# Patient Record
Sex: MALE | Race: WHITE | NOT HISPANIC OR LATINO | Employment: UNEMPLOYED | URBAN - METROPOLITAN AREA
[De-identification: names, ages, dates, MRNs, and addresses within clinical notes are randomized per-mention and may not be internally consistent; named-entity substitution may affect disease eponyms.]

---

## 2015-04-14 LAB — CRC RECOMMENDATION EXT: NORMAL

## 2017-03-13 ENCOUNTER — HISTORICAL (OUTPATIENT)
Dept: INTERNAL MEDICINE | Facility: CLINIC | Age: 62
End: 2017-03-13

## 2017-10-04 ENCOUNTER — HISTORICAL (OUTPATIENT)
Dept: RADIOLOGY | Facility: HOSPITAL | Age: 62
End: 2017-10-04

## 2017-10-27 ENCOUNTER — HISTORICAL (OUTPATIENT)
Dept: INTERNAL MEDICINE | Facility: CLINIC | Age: 62
End: 2017-10-27

## 2017-12-04 ENCOUNTER — HISTORICAL (OUTPATIENT)
Dept: INTERNAL MEDICINE | Facility: CLINIC | Age: 62
End: 2017-12-04

## 2018-03-06 ENCOUNTER — HISTORICAL (OUTPATIENT)
Dept: INTERNAL MEDICINE | Facility: CLINIC | Age: 63
End: 2018-03-06

## 2018-06-04 ENCOUNTER — HISTORICAL (OUTPATIENT)
Dept: INTERNAL MEDICINE | Facility: CLINIC | Age: 63
End: 2018-06-04

## 2018-08-15 ENCOUNTER — HISTORICAL (OUTPATIENT)
Dept: ADMINISTRATIVE | Facility: HOSPITAL | Age: 63
End: 2018-08-15

## 2018-10-17 ENCOUNTER — HISTORICAL (OUTPATIENT)
Dept: INTERNAL MEDICINE | Facility: CLINIC | Age: 63
End: 2018-10-17

## 2019-02-28 ENCOUNTER — HISTORICAL (OUTPATIENT)
Dept: ADMINISTRATIVE | Facility: HOSPITAL | Age: 64
End: 2019-02-28

## 2019-03-02 LAB — FINAL CULTURE: NORMAL

## 2019-05-28 ENCOUNTER — HISTORICAL (OUTPATIENT)
Dept: INTERNAL MEDICINE | Facility: CLINIC | Age: 64
End: 2019-05-28

## 2019-11-19 ENCOUNTER — HISTORICAL (OUTPATIENT)
Dept: ADMINISTRATIVE | Facility: HOSPITAL | Age: 64
End: 2019-11-19

## 2019-12-11 ENCOUNTER — HISTORICAL (OUTPATIENT)
Dept: INTERNAL MEDICINE | Facility: CLINIC | Age: 64
End: 2019-12-11

## 2020-01-20 ENCOUNTER — HISTORICAL (OUTPATIENT)
Dept: RADIOLOGY | Facility: HOSPITAL | Age: 65
End: 2020-01-20

## 2020-09-24 ENCOUNTER — HISTORICAL (OUTPATIENT)
Dept: LAB | Facility: HOSPITAL | Age: 65
End: 2020-09-24

## 2021-05-09 LAB
LEFT EYE DM RETINOPATHY: NEGATIVE
RIGHT EYE DM RETINOPATHY: NEGATIVE

## 2021-09-03 ENCOUNTER — HISTORICAL (OUTPATIENT)
Dept: INTERNAL MEDICINE | Facility: CLINIC | Age: 66
End: 2021-09-03

## 2021-09-03 LAB
ABS NEUT (OLG): 6.06 X10(3)/MCL (ref 2.1–9.2)
ALBUMIN SERPL-MCNC: 3.7 GM/DL (ref 3.4–4.8)
ALBUMIN/GLOB SERPL: 1.2 RATIO (ref 1.1–2)
ALP SERPL-CCNC: 96 UNIT/L (ref 40–150)
ALT SERPL-CCNC: 21 UNIT/L (ref 0–55)
APPEARANCE, UA: CLEAR
AST SERPL-CCNC: 14 UNIT/L (ref 5–34)
BACTERIA #/AREA URNS AUTO: ABNORMAL /HPF
BASOPHILS # BLD AUTO: 0 X10(3)/MCL (ref 0–0.2)
BASOPHILS NFR BLD AUTO: 1 %
BILIRUB SERPL-MCNC: 0.4 MG/DL
BILIRUB UR QL STRIP: NEGATIVE
BILIRUBIN DIRECT+TOT PNL SERPL-MCNC: 0.2 MG/DL (ref 0–0.5)
BILIRUBIN DIRECT+TOT PNL SERPL-MCNC: 0.2 MG/DL (ref 0–0.8)
BUN SERPL-MCNC: 6.5 MG/DL (ref 8.4–25.7)
CALCIUM SERPL-MCNC: 8.9 MG/DL (ref 8.8–10)
CHLORIDE SERPL-SCNC: 108 MMOL/L (ref 98–107)
CHOLEST SERPL-MCNC: 114 MG/DL
CHOLEST/HDLC SERPL: 3 {RATIO} (ref 0–5)
CO2 SERPL-SCNC: 25 MMOL/L (ref 23–31)
COLOR UR: NORMAL
CREAT SERPL-MCNC: 1.02 MG/DL (ref 0.73–1.18)
CREAT UR-MCNC: 149.6 MG/DL (ref 58–161)
DEPRECATED CALCIDIOL+CALCIFEROL SERPL-MC: 31.8 NG/ML (ref 30–80)
EOSINOPHIL # BLD AUTO: 0.2 X10(3)/MCL (ref 0–0.9)
EOSINOPHIL NFR BLD AUTO: 3 %
ERYTHROCYTE [DISTWIDTH] IN BLOOD BY AUTOMATED COUNT: 15.1 % (ref 11.5–14.5)
EST. AVERAGE GLUCOSE BLD GHB EST-MCNC: 139.8 MG/DL
GLOBULIN SER-MCNC: 3.2 GM/DL (ref 2.4–3.5)
GLUCOSE (UA): NEGATIVE
GLUCOSE SERPL-MCNC: 116 MG/DL (ref 82–115)
HBA1C MFR BLD: 6.5 %
HCT VFR BLD AUTO: 47.1 % (ref 40–51)
HDLC SERPL-MCNC: 37 MG/DL (ref 35–60)
HGB BLD-MCNC: 14.6 GM/DL (ref 13.5–17.5)
HGB UR QL STRIP: NEGATIVE
HYALINE CASTS #/AREA URNS LPF: ABNORMAL /LPF
IMM GRANULOCYTES # BLD AUTO: 0.03 10*3/UL
IMM GRANULOCYTES NFR BLD AUTO: 0 %
KETONES UR QL STRIP: NEGATIVE
LDLC SERPL CALC-MCNC: 51 MG/DL (ref 50–140)
LEUKOCYTE ESTERASE UR QL STRIP: NEGATIVE
LYMPHOCYTES # BLD AUTO: 1.6 X10(3)/MCL (ref 0.6–4.6)
LYMPHOCYTES NFR BLD AUTO: 18 %
MCH RBC QN AUTO: 28.9 PG (ref 26–34)
MCHC RBC AUTO-ENTMCNC: 31 GM/DL (ref 31–37)
MCV RBC AUTO: 93.3 FL (ref 80–100)
MICROALBUMIN UR-MCNC: 24.9 MG/L
MICROALBUMIN/CREAT RATIO PNL UR: 16.6 MG/GM CR (ref 0–30)
MONOCYTES # BLD AUTO: 0.7 X10(3)/MCL (ref 0.1–1.3)
MONOCYTES NFR BLD AUTO: 8 %
NEUTROPHILS # BLD AUTO: 6.06 X10(3)/MCL (ref 2.1–9.2)
NEUTROPHILS NFR BLD AUTO: 69 %
NITRITE UR QL STRIP: NEGATIVE
NRBC BLD AUTO-RTO: 0 % (ref 0–0.2)
PH UR STRIP: 5 [PH] (ref 4.5–8)
PLATELET # BLD AUTO: 263 X10(3)/MCL (ref 130–400)
PMV BLD AUTO: 10.5 FL (ref 7.4–10.4)
POTASSIUM SERPL-SCNC: 4.6 MMOL/L (ref 3.5–5.1)
PROT UR QL STRIP: NEGATIVE
RBC # BLD AUTO: 5.05 X10(6)/MCL (ref 4.5–5.9)
RBC #/AREA URNS AUTO: ABNORMAL /HPF
SODIUM SERPL-SCNC: 140 MMOL/L (ref 136–145)
SP GR UR STRIP: 1.01 (ref 1–1.03)
SQUAMOUS #/AREA URNS LPF: ABNORMAL /LPF
TRIGL SERPL-MCNC: 132 MG/DL (ref 34–140)
UROBILINOGEN UR STRIP-ACNC: NORMAL
VLDLC SERPL CALC-MCNC: 26 MG/DL
WBC # SPEC AUTO: 8.7 X10(3)/MCL (ref 4.5–11)
WBC #/AREA URNS AUTO: ABNORMAL /HPF

## 2021-09-13 ENCOUNTER — HISTORICAL (OUTPATIENT)
Dept: ADMINISTRATIVE | Facility: HOSPITAL | Age: 66
End: 2021-09-13

## 2021-12-16 ENCOUNTER — HISTORICAL (OUTPATIENT)
Dept: RADIOLOGY | Facility: HOSPITAL | Age: 66
End: 2021-12-16

## 2022-04-09 ENCOUNTER — HISTORICAL (OUTPATIENT)
Dept: ADMINISTRATIVE | Facility: HOSPITAL | Age: 67
End: 2022-04-09

## 2022-04-25 VITALS
BODY MASS INDEX: 31.74 KG/M2 | HEIGHT: 69 IN | WEIGHT: 214.31 LBS | DIASTOLIC BLOOD PRESSURE: 72 MMHG | SYSTOLIC BLOOD PRESSURE: 134 MMHG | OXYGEN SATURATION: 100 %

## 2022-05-02 NOTE — HISTORICAL [facility] CERNER
This is a historical note converted from [employer]. Formatting and pictures may have been removed.  Please reference [employer] for original formatting and attached multimedia. Chief Complaint  cough productive off and on ,chest congetion ,nasal congetion x3 weeks  History of Present Illness  64 yo male with productive cough, nasal congestion, post nasal drip?x 3 weeks. + Wheezing. No chest pain or swelling; no orthopnea. PFT was done by PCP earlier this year was normal. He is on ventolin MDI and nebs. No recent weight loss or night sweats reported.  Review of Systems  GENERAL: Negative except as stated?in HPI  CV: Negative except as stated in HPI  RESP: Negative except as stated?in HPI  GI: Negative?except as stated in?HPI  : Negative?except as stated in?HPI  SKIN: Negative?except as stated in?HPI  Neuro: Negative?except as stated in?HPI  MS: Negative?except as stated in?HPI  Psych: Negative?except as stated in?HPI  Physical Exam  Vitals & Measurements  T:?36.8? ?C (Oral)? HR:?83(Peripheral)? RR:?18? BP:?121/69? SpO2:?94%?  HT:?172?cm? HT:?172?cm? WT:?94.2?kg? WT:?94.2?kg? BMI:?31.84?  Vital Signs reviewed  GENERAL: Awake and alert; no acute distress.  HENT: Normocephalic.??Boggy turbinates with erythema and exudate noted. Normal appearing oral cavity; postnasal drip noted to?posterior pharynx.  CV: Normal rate and rhythm. No murmur or JVD. No edema.  RESP: Respirations even and nonlabored. ?Inspiratory and?expiratory wheezes throughout all lung fields; prolonged expiratory phase noted.  NEURO: Awake, alert and oriented. No focal or sensory deficits.  INTEGUMENTARY: Skin warm, dry, and intact. No rashes or?unusual bruising.  PSYCH: Appropriate mood and affect; cooperative.  Assessment/Plan  1.?Acute bronchospasm,?Acute bronchospasm  Chest x-ray PA and lateral with no acute cardiopulmonary processes. Duoneb ?1 with improvement of air exchange and coughing. ?Prescriptions for DuoNeb, prednisone. Tussionex cough syrup 5 mL  at bedtime as needed for cough. ?Follow up with PCP if condition persists. ?Present to ER for any?respiratory distress worsening in?condition.  Ordered:  After Hrs Visit [id number] PC, Cough  Acute bronchospasm  Sinusitis, 08/15/18 19:09:00 CDT  Office/Outpatient Visit Level 4 Established [id number] PC, Cough  Acute bronchospasm  Sinusitis, 08/15/18 19:09:00 CDT  ?  2.?Sinusitis  Singulair 10 mg daily; Flonase nasal spray as prescribed. ?Follow up with PCP if condition worsens.  Ordered:  After Hrs Visit [id number] PC, Cough  Acute bronchospasm  Sinusitis, 08/15/18 19:09:00 CDT  Office/Outpatient Visit Level 4 Established [id number] PC, Cough  Acute bronchospasm  Sinusitis, 08/15/18 19:09:00 CDT  ?  Orders:  albuterol-ipratropium, 3 mL, INH, QID, PRN PRN as needed for shortness of breath or wheezing, # 90 mL, 3 Refill(s), Pharmacy: [facility]  chlorpheniramine-hydrocodone, See Instructions, PRN PRN for cough, 5 ml orally at bedtime as needed for cough, # 50 mL, 0 Refill(s)  fluticasone nasal, 1 spray(s), Nasal, BID, # 1 bottle(s), 1 Refill(s), Pharmacy: [facility]  montelukast, 10 mg = 1 tab(s), Oral, qPM, # 30 tab(s), 2 Refill(s), Pharmacy: [facility]  predniSONE, 40 mg = 2 tab(s), Oral, Daily, X 5 day(s), # 10 tab(s), 0 Refill(s), Pharmacy: [facility]   Problem List/Past Medical History  Ongoing  BPH with urinary obstruction  Cholesterol  Chronic bronchitis  Diabetes  Family history of prostate carcinoma  HTN (hypertension)  Left hydrocele  MI (myocardial infarction)  Historical  Diabetes  Hypertension  Myocardial infarct  Procedure/Surgical History  Cataract Extraction Phacoemulsification (Left) (03/17/2016)  Extracapsular cataract removal with insertion of intraocular lens prosthesis (1 stage procedure), manual or mechanical technique (eg, irrigation and aspiration or phacoemulsification) (03/17/2016)  Replacement of Left Lens with Synthetic Substitute, Percutaneous  Approach (03/17/2016)  Colonoscopy (04/14/2015)  Colonoscopy (04/14/2015)  Colonoscopy, flexible; diagnostic, including collection of specimen(s) by brushing or washing, when performed (separate procedure) (04/14/2015)  Percutaneous transluminal coronary atherectomy, with intracoronary stent, with coronary angioplasty when performed; each additional branch of a major coronary artery (List separately in addition to code for primary procedure)  Stent placement   Medications  albuterol 2.5 mg/3 mL (0.083%) inhalation solution, 2.5 mg= 3 mL, INH, q6hr, PRN, 3 refills  aspirin 81 mg oral Delayed Release (EC) tablet, 81 mg= 1 tab(s), Oral, Daily, 4 refills  DuoNeb 0.5 mg-2.5 mg/3 mL inhalation solution, 3 mL, INH, QID, PRN, 3 refills  Fish Oil oral capsule, 1 cap(s), Oral, Daily  Flonase 50 mcg/inh nasal spray, 1 spray(s), Nasal, BID, 1 refills  glipiZIDE 10 mg oral tablet, 10 mg= 1 tab(s), Oral, BID, 3 refills  Lidocaine Viscous 2% mucous membrane solution, 15 mL/EA, N/A, Once  lisinopril 5 mg oral tablet, 5 mg= 1 tab(s), Oral, Daily, 4 refills  metFORMIN 1000 mg oral tablet, 1000 mg= 1 tab(s), Oral, BID, 6 refills  metoprolol tartrate 50 mg oral tab, 50 mg= 1 tab(s), Oral, BID, 4 refills  Nebulizer Machine, See Instructions  Nitrostat 0.4 mg sublingual tab, 0.4 mg= 1 tab(s), SL, q5min, PRN  prednisONE 20 mg oral tablet, 40 mg= 2 tab(s), Oral, Daily  simvastatin 40 mg oral tablet, See Instructions, 2 refills  Singulair 10 mg oral TABLET, 10 mg= 1 tab(s), Oral, qPM, 2 refills  Tussionex [employer] 10 mg-8 mg/5 mL oral suspension, extended release, See Instructions, PRN  Ventolin HFA 90 mcg/inh inhalation aerosol, 2 puff(s), INH, q4hr, PRN, 3 refills  Allergies  No Known Allergies  Social History  Alcohol - Denies Alcohol Use, 08/14/2014  Past, Beer, Wine, Started age 17 Years. Stopped age 48 Years., 08/15/2018  Employment/School  Employed, Work/School description: fisherman. Highest education level: High school. Operates  hazardous equipment: No., 06/11/2015  Exercise  Exercise frequency: 5-6 times/week. Self assessment: Good condition. Exercise type: Walking., 03/19/2015  Home/Environment  Lives with Spouse. Living situation: Home/Independent. Alcohol abuse in household: No. Substance abuse in household: No. Smoker in household: No. Injuries/Abuse/Neglect in household: No. Feels unsafe at home: No. Safe place to go: Yes. Family/Friends available for support: Yes. Concern for family members at home: No. Major illness in household: No. Financial concerns: No. TV/Computer concerns: No., 06/11/2015  Nutrition/Health  Type of diet: regular. Regular, Caffeine intake amount: coffee 3 cups in morning. Wants to lose weight: Yes. Sleeping concerns: No. Feels highly stressed: No., 06/11/2015  Sexual  Sexually active: Yes. Sexually active at age 15 Years. Number of current partners 1. Number of lifetime partners 7. Sexual orientation: Heterosexual. History of sexual abuse: No., 03/03/2015  Substance Abuse - Denies Substance Abuse, 08/14/2014  Never, 08/15/2018  Tobacco - Denies Tobacco Use, 08/14/2014  Former smoker, Cigarettes, Started age 15.0 Years. Stopped age 48 Years. Previous treatment: None., 01/11/2018  Family History  Diabetes mellitus type 2: Mother and Brother.  Heart attack: Father and Brother.  Hypertension.: Father and Brother.  Primary malignant neoplasm of prostate: Brother.  Schizophrenia: Mother.  Stroke: Mother.  Immunizations  Vaccine Date Status Comments   tetanus/diphtheria/pertussis, acel(Tdap) 06/14/2018 Given    influenza virus vaccine, inactivated 10/03/2017 Given    influenza virus vaccine, inactivated 12/15/2016 Given    pneumococcal 13-valent conjugate vaccine 10/28/2015 Given tolerated well   influenza virus vaccine, inactivated 10/28/2015 Given tolerated well   influenza virus vaccine, inactivated 10/22/2014 Recorded    Health Maintenance  Health Maintenance  ???Pending?(in the next year)  ??? ??OverDue  ??? ? ?  ?Coronary Artery Disease Maintenance-Antiplatelet Agent Prescribed due??and every?  ??? ? ? ?Coronary Artery Disease Maintenance-Lipid Lowering Therapy due??and every?  ??? ? ? ?Influenza Vaccine due??and every?  ??? ??Due?  ??? ? ? ?Diabetes Maintenance-Eye Exam due??08/15/18??and every 1??year(s)  ??? ? ? ?Diabetes Maintenance-Foot Exam due??08/15/18??and every?  ??? ? ? ?Zoster Vaccine due??08/15/18??and every 100??year(s)  ??? ??Due In Future?  ??? ? ? ?Hypertension Management-BMP not due until??11/22/18??and every 1??year(s)  ??? ? ? ?Aspirin Therapy for CVD Prevention not due until??04/03/19??and every 1??year(s)  ??? ? ? ?Alcohol Misuse Screening not due until??04/03/19??and every 1??year(s)  ??? ? ? ?Diabetes Maintenance-Fasting Lipid Profile not due until??06/04/19??and every 1??year(s)  ??? ? ? ?Diabetes Maintenance-HgbA1c not due until??06/04/19??and every 1??year(s)  ??? ? ? ?Blood Pressure Screening not due until??07/16/19??and every 1??year(s)  ??? ? ? ?Body Mass Index Check not due until??07/16/19??and every 1??year(s)  ??? ? ? ?Depression Screening not due until??07/16/19??and every 1??year(s)  ??? ? ? ?Hypertension Management-Blood Pressure not due until??07/16/19??and every 1??year(s)  ???Satisfied?(in the past 1 year)  ??? ??Satisfied?  ??? ? ? ?Alcohol Misuse Screening on??04/03/18.??Satisfied by Lacy Wesley MD  ??? ? ? ?Aspirin Therapy for CVD Prevention on??04/03/18.??Satisfied by Lacy Wesley MD??Reason: Expectation Satisfied Elsewhere  ??? ? ? ?Blood Pressure Screening on??08/15/18.??Satisfied by Ambar Whitaker LPN  ??? ? ? ?Body Mass Index Check on??08/15/18.??Satisfied by Ambar Whitaker LPN  ??? ? ? ?Coronary Artery Disease Maintenance-Lipid Lowering Therapy on??06/14/18.??Satisfied by Lacy Wesley MD  ??? ? ? ?Depression Screening on??08/15/18.??Satisfied by Ambar Whitaker LPN  ??? ? ? ?Diabetes Maintenance-Eye Exam on??05/09/18.??Satisfied by Rene Nguyen MDghar  ??? ? ? ?Diabetes  Maintenance-Fasting Lipid Profile on??06/04/18.??Satisfied by Jacy Georges  ??? ? ? ?Diabetes Maintenance-HgbA1c on??09/14/18.??Satisfied by Lacy Wesley MD  ??? ? ? ?Diabetes Screening on??06/04/18.??Satisfied by Jacy Goerges  ??? ? ? ?Hypertension Management-Blood Pressure on??08/15/18.??Satisfied by Ambar Whitaker LPN  ??? ? ? ?Influenza Vaccine on??10/03/17.??Satisfied by Donna Teixeira  ??? ? ? ?Lipid Screening on??06/04/18.??Satisfied by Jacy Georges  ??? ? ? ?Obesity Screening on??08/15/18.??Satisfied by Ambar Whitaker LPN  ??? ? ? ?Smoking Cessation (Coronary Artery Disease) on??11/22/17.??Satisfied by Lyndsay Anthony RN  ??? ? ? ?Smoking Cessation (Diabetes) on??11/22/17.??Satisfied by Lyndsay Anthony RN  ??? ? ? ?Tetanus Vaccine on??06/14/18.??Satisfied by Donna Teixeira  ?  ?  (08/15/2018 18:21 CDT XR Chest 2 Views)  Reason For Exam  cough;Cough  ?  Radiology Report  Clinical History  Cough  ?  Technique  2 views of the chest.  ?  Comparison  November 22nd 2017  ?  Findings  Lungs are clear with no visualized focal airspace opacity.  The trachea appears midline.  The cardiomediastinal silhouette is within normal limits.  There is no evidence of pneumothorax or pleural effusion.  Visualized abdomen, soft tissues, and osseous structures are  unremarkable.  ?  Impression  No acute cardiopulmonary process.  ?  ?  Signature Line  Electronically Signed By: Morgan Rodriguez MD  Date/Time Signed: 08/15/2018 18:22  ? [1]     [1]?XR Chest 2 Views; Morgan Rodriguez MD 08/15/2018 18:21 CDT

## 2022-05-02 NOTE — HISTORICAL [facility] CERNER
This is a historical note converted from [employer]. Formatting and pictures may have been removed.  Please reference [employer] for original formatting and attached multimedia. Chief Complaint  Needs refills on all meds. Left heel pain x6 months off and on, no injury  History of Present Illness  66 year old  male is ?138/78 today.? Patients blood pressure is controlled on home meds. ?Patient has requested?for medication refills?today.??Patient is complaining of?? L heel pain, episodic and worse with long time standing.? He takes Tylenol for it?which helps for the pain.? An x-ray of the foot?is ordered for the patient today.?Patient is advised to use heel pad for?the pain as well. ?Patient denied any chest pain, shortness of breath, fever, chills, nausea, vomiting, weakness or any abdominal pain at this time.? His A1c level?is 6.5 today,?managed?with his home meds.? His lipid panels are unremarkable?at this time.? Patient got his covid vaccine ( Arch[employer]izer). Patient is to return back to the clinic in 6 months with labs.  Review of Systems  Constitutional:?no fever, fatigue, weakness  Eye:?no vision loss, eye redness, drainage, or pain  ENMT:?no sore throat, ear pain, sinus pain/congestion, nasal congestion/drainage  Respiratory:?no cough, no wheezing, no shortness of breath  Cardiovascular:?no chest pain, no palpitations, no edema  Gastrointestinal:?no nausea, vomiting, or diarrhea. No abdominal pain  Musculoskeletal:?Left plantar?foot pain , no joint swelling  Integumentary:?no skin rash or abnormal lesion  Neurologic: no headache, no dizziness, no weakness or numbness  ?  Physical Exam  Vitals & Measurements  T:?36.6? ?C (Oral)? HR:?64(Peripheral)? RR:?18? BP:?138/78? SpO2:?100%?  HT:?174.00?cm? WT:?94.300?kg? BMI:?31.15?  General:?Alert and oriented, No acute distress.  Respiratory:?Lungs are clear to auscultation, Respirations are non-labored, Breath sounds are equal, Symmetrical chest wall expansion, No chest wall  tenderness.  Cardiovascular:?Normal rate, Regular rhythm, No murmur, No gallop, Good pulses equal in all extremities, Normal peripheral perfusion, No edema.  Musculoskeletal:?Left plantar foot tenderness on palpation ,Normal range of motion, Normal strength, No tenderness, No swelling, No deformity, Normal gait.  Integumentary: Warm, Pink, Intact, Moist, No pallor, No rash.  Cognition and Speech: Oriented, Speech clear and coherent, Functional cognition intact.?  ?  ?  Assessment/Plan  Left?heel pain  Likely Plantar fascitis  -Left heel pain  -Takes tylenol whihc helps  - Xray of the left foot ordered  ?   Diabetes mellitus type II - Controlled  -POC ~6.5  -pt did not tolerate empagliflozin (reported GI upset)  -continue Januvia (sitagliptin) 100mg PO daily  -continue metformin 1000mg BID and glipizide 10mg BID  ?   Hypertension  -normotensive  -continue metoprolol tartrate 50mg BID & losartan to 25mg BID (convenient dosing as metoprolol already given BID)  -refuses BP cuff due to financial reasons, will continue to check at pharmacy when there  ?   L Hydrocele s/p surgical correction  -resolved  -followed by Urology  ?   CAD s/p?coronary stent  -well controlled, MI was 12 years ago, denies any current?chest pain  -continue ASA, statin, beta blocker; held ACE-i at this time?due to cough  ?   Dyslipidemia  -on atorvastatin 40mg qd  -ASCVD score of 23.1% currently, 7.5% if optimized medically  -Lipid panels are unremarkable  ?   Diverticulosis  -last episode of diverticulitis was in March 2016  -encouraged high fiber diet,?and weight loss  -last colonoscopy was 2015?  ?  L eye cataract- resolved  - following up with ophthalmology [facility]. Had ophthalmology clinic visit on April 2017. Diabetes mellitus without retinopathy.  ?   Health Maintenance  Vaccinations  -Flu: 10/1/2020  -Pneumonia: PCV 13 2015, Pneumococcal 23 2021  -Shingles: given?two doses 2019  -Tdap: given 2018  Screening  -Lung Ca. Screening: discuss  risks/benefits, pt chose to defer at this time and discuss at later visit  -Colon Ca. Screening: Colonscopy due 2025  -AAA: discuss at next visit  -Hep C, HIV: negative 2016  -Prostate: discussed PSA. Pt chose no surveillance at this time but will consider for future visits  ?  ?   Problem List/Past Medical History  Ongoing  Cholesterol  Chronic bronchitis  Diabetes  Family history of prostate carcinoma  Flu vaccine need  HTN (hypertension)  Hyperlipidemia  MI (myocardial infarction)  Historical  BPH with urinary obstruction  Diabetes  Hypertension  Left hydrocele  Myocardial infarct  Procedure/Surgical History  Cataract Extraction Phacoemulsification (Left) (03/17/2016)  Extracapsular cataract removal with insertion of intraocular lens prosthesis (1 stage procedure), manual or mechanical technique (eg, irrigation and aspiration or phacoemulsification) (03/17/2016)  Replacement of Left Lens with Synthetic Substitute, Percutaneous Approach (03/17/2016)  Colonoscopy (04/14/2015)  Colonoscopy (04/14/2015)  Colonoscopy, flexible; diagnostic, including collection of specimen(s) by brushing or washing, when performed (separate procedure) (04/14/2015)  Percutaneous transluminal coronary atherectomy, with intracoronary stent, with coronary angioplasty when performed; each additional branch of a major coronary artery (List separately in addition to code for primary procedure)   Medications  aspirin 81 mg oral Delayed Release (EC) tablet, 81 mg= 1 tab(s), Oral, Daily, 4 refills  atorvastatin 40 mg oral tablet, See Instructions, 1 refills  Fish Oil oral capsule, 1 cap(s), Oral, Daily  fluticasone 50 mcg/inh nasal spray, See Instructions  glipiZIDE 10 mg oral tablet, 10 mg= 1 tab(s), Oral, BID  Januvia 100 mg oral tablet, 100 mg= 1 tab(s), Oral, Daily, 6 refills  Lidocaine Viscous 2% mucous membrane solution, 15 mL/EA, N/A, Once  losartan 25 mg oral tablet, 25 mg= 1 tab(s), Oral, BID, 5 refills  metFORMIN 1000 mg oral tablet,  1000 mg= 1 tab(s), Oral, BID, 6 refills  metoprolol tartrate 50 mg oral tab, 50 mg= 1 tab(s), Oral, BID, 6 refills  pneumococcal 23-polyvalent vaccine, 0.5 mL, IM, Once  Allergies  No Known Allergies  Social History  Abuse/Neglect  No, No, Yes, 09/13/2021  Alcohol - Denies Alcohol Use, 08/14/2014  Current, Beer, 1-2 times per week, 02/18/2021  Employment/School  Retired, 02/18/2021  Exercise  Exercise duration: 30. Exercise frequency: Daily. Exercise type: Walking., 02/18/2021  Home/Environment  Lives with Alone. Living situation: Home/Independent. Single family house, 02/18/2021  Nutrition/Health  Regular, Low carbohydrate, Good, 05/06/2021  Spiritual/Cultural  Sikhism, 02/18/2021  Substance Use - Denies Substance Abuse, 08/14/2014  Never, 02/18/2021  Tobacco - Denies Tobacco Use, 08/14/2014  Former smoker, quit more than 30 days ago, N/A, 09/13/2021  Family History  Diabetes mellitus type 2: Mother and Brother.  Heart attack: Father and Brother.  Hypertension.: Father and Brother.  Primary malignant neoplasm of prostate: Brother.  Schizophrenia: Mother.  Stroke: Mother.  Immunizations  Vaccine Date Status Comments   COVID-19 MRNA, LNP-S, PF- Pfizer 08/09/2021 Recorded    COVID-19 MRNA, LNP-S, PF- Pfizer 07/19/2021 Recorded    pneumococcal 23-polyvalent vaccine 05/06/2021 Given    influenza virus vaccine, inactivated 10/01/2020 Given    influenza virus vaccine, inactivated 12/26/2019 Given    zoster vaccine, inactivated 05/17/2019 Given    zoster vaccine, inactivated 10/29/2018 Given    influenza virus vaccine, inactivated 10/29/2018 Given    tetanus/diphtheria/pertussis, acel(Tdap) 06/14/2018 Given    influenza virus vaccine, inactivated 10/03/2017 Given    influenza virus vaccine, inactivated 12/15/2016 Given    pneumococcal 13-valent conjugate vaccine 10/28/2015 Given tolerated well   influenza virus vaccine, inactivated 10/28/2015 Given tolerated well   influenza virus vaccine, inactivated 10/22/2014 Recorded     Health Maintenance  Health Maintenance  ???Pending?(in the next year)  ??? ??Due?  ??? ? ? ?COPD Maintenance-Spirometry due??09/13/21??Unknown Frequency  ??? ? ? ?IPPE due??09/13/21??One-time only  ??? ? ? ?Medicare Annual Wellness Exam due??09/13/21??and every 1??year(s)  ??? ??Due In Future?  ??? ? ? ?Obesity Screening not due until??01/01/22??and every 1??year(s)  ??? ? ? ?Advance Directive not due until??01/02/22??and every 1??year(s)  ??? ? ? ?Alcohol Misuse Screening not due until??01/02/22??and every 1??year(s)  ??? ? ? ?Cognitive Screening not due until??01/02/22??and every 1??year(s)  ??? ? ? ?Fall Risk Assessment not due until??01/02/22??and every 1??year(s)  ??? ? ? ?Functional Assessment not due until??01/02/22??and every 1??year(s)  ??? ? ? ?Diabetes Maintenance-Foot Exam not due until??05/06/22??and every 1??year(s)  ??? ? ? ?Diabetes Maintenance-Eye Exam not due until??05/09/22??and every 1??year(s)  ??? ? ? ?Diabetes Maintenance-HgbA1c not due until??09/03/22??and every 1??year(s)  ??? ? ? ?Hypertension Management-BMP not due until??09/03/22??and every 1??year(s)  ???Satisfied?(in the past 1 year)  ??? ??Satisfied?  ??? ? ? ?ADL Screening on??09/13/21.??Satisfied by Gayle Pedroza LPN  ??? ? ? ?Advance Directive on??05/06/21.??Satisfied by Jorden SCHAEFER, Aarti  ??? ? ? ?Alcohol Misuse Screening on??05/06/21.??Satisfied by Kelsy Negrete MD  ??? ? ? ?Aspirin Therapy for CVD Prevention on??09/13/21.??Satisfied by Daphney Marino DO  ??? ? ? ?Blood Pressure Screening on??09/13/21.??Satisfied by Galye Pedroza LPN  ??? ? ? ?Body Mass Index Check on??09/13/21.??Satisfied by Gayle Pedroza LPN  ??? ? ? ?Cognitive Screening on??05/06/21.??Satisfied by Aarti Leonardo LPN  ??? ? ? ?Coronary Artery Disease Maintenance-Lipid Lowering Therapy on??09/13/21.??Satisfied by Daphney Marino DO  ??? ? ? ?Depression Screening on??09/13/21.??Satisfied by Gayle Pedroza LPN  ??? ? ?  ?Diabetes Maintenance-Fasting Lipid Profile on??09/03/21.??Satisfied by Anders Duarte.  ??? ? ? ?Diabetes Screening on??09/03/21.??Satisfied by Anders Duarte.  ??? ? ? ?Fall Risk Assessment on??09/13/21.??Satisfied by Gayle Pedroza LPN  ??? ? ? ?Functional Assessment on??05/06/21.??Satisfied by Aarti Leonardo LPN  ??? ? ? ?Hypertension Management-Blood Pressure on??09/13/21.??Satisfied by Gayle Pedroza LPN  ??? ? ? ?Influenza Vaccine on??10/01/20.??Satisfied by Jorden SCHAEFER, Aarti  ??? ? ? ?Lipid Screening on??09/03/21.??Satisfied by Anders Duarte.  ??? ? ? ?Obesity Screening on??09/13/21.??Satisfied by Gayle Pedroza LPN  ??? ? ? ?Pneumococcal Vaccine on??05/06/21.??Satisfied by Jorden SCHAEFER, Aarti  ??? ??Refused?  ??? ? ? ?COPD Maintenance-Spirometry on??05/06/21.??Recorded by Kelsy Negrete MD  ?      Reviewed present illness, physical exam, assessment/plan of resident. Case discussed with the resident. Care provided is reasonable and necessary.

## 2022-05-02 NOTE — HISTORICAL [facility] CERNER
This is a historical note converted from [employer]. Formatting and pictures may have been removed.  Please reference [employer] for original formatting and attached multimedia. Chief Complaint  Coughing x 1 month ?Requesting steroid injection if possible  History of Present Illness  64-year-old male with 4 to 6 weeks of dry cough, occasional sputum production which is clear.? Began with what sounds like URI, took a Medrol Dosepak and a course of doxycycline in mid October with no significant relief.? He states he is having some wheezing, minimal shortness of breath, no orthopnea or PND, no lower extremity edema.? He is using albuterol at home with brief episodes of relief.? He has had no fever or chills, he does have occasional yellow rhinorrhea and sinus pressure.? No sore throat.  Chart reviewed-normal chest x-ray August 2018.? PFTs were within normal limits in 2016 according to a provider note.  Review of Systems  Constitutional: negative except as stated in HPI  Eye: negative except as stated in HPI  ENT: negative except as stated in HPI  Respiratory: negative except as stated in HPI  Cardiovascular: negative except as stated in HPI  Gastrointestinal: negative except as stated in HPI  Genitourinary: negative except as stated in HPI  Hema/Lymph: negative except as stated in HPI  Endocrine: negative except as stated in HPI  Immunologic: negative except as stated in HPI  Musculoskeletal: negative except as stated in HPI  Integumentary: negative except as stated in HPI  Neurologic: negative except as stated in HPI  ?  Physical Exam  Vitals & Measurements  T:?36.8? ?C (Oral)? HR:?63(Peripheral)? RR:?20? BP:?159/63? SpO2:?99%?  HT:?174?cm? WT:?99?kg? BMI:?32.7?  VITAL SIGNS: ?Reviewed. ? ?  GENERAL: In no apparent distress  HEAD:? No signs of head trauma  EYES: Pupils are equal.? Extraocular motions intact  EARS: Small amount of cerumen bilaterally, TMs clear  NOSE: No rhinorrhea.? Mild bilateral maxillary sinus tenderness,  mild frontal sinus tenderness  MOUTH:?Cobblestoning, no erythema or exudate  NECK: No adenopathy, no JVD??  CHEST:?Diffuse?faint end expiratory wheezes bilaterally, nonfocal,?no crackles or rhonchi.  CARDIAC: Regular rate and rhythm  ABDOMEN:?Soft, nontender  MUSCULOSKELETAL: Good range of motion of all major joints. Extremities without clubbing, cyanosis or edema  NEUROLOGIC EXAM: Alert and oriented x 3.? No focal sensory or strength deficits.?? Speech normal.? Follows commands  PSYCHIATRIC: Mood normal  SKIN: No visualized rash  ?  Lab/Xray:  Chest x-ray-within normal limits  CBG-75  ?  ?  Assessment/Plan  Cough?R05  Ordered:  betamethasone, 9 mg, form: Injection, IM, Once-Unscheduled, first dose 11/19/19 11:00:00 CST  ?  Orders:  benzonatate, 200 mg = 1 cap(s), Oral, TID, PRN PRN cough, # 30 cap(s), 1 Refill(s), Pharmacy: [facility]  levocetirizine, 5 mg = 1 tab(s), Oral, qPM, # 14 tab(s), 2 Refill(s), Pharmacy: [id number]  Discussed potential etiologies of cough.? Will give IM Celestone,?Tessalon Perles,?switch to Xyzal.? I encouraged him to follow-up with his PCP to continue work-up as needed.? ER precautions discussed.   Problem List/Past Medical History  Ongoing  BPH with urinary obstruction  Cholesterol  Chronic bronchitis  Diabetes  Family history of prostate carcinoma  HTN (hypertension)  Hyperlipidemia  Left hydrocele  MI (myocardial infarction)  Historical  Diabetes  Hypertension  Myocardial infarct  Procedure/Surgical History  Cataract Extraction Phacoemulsification (Left) (03/17/2016)  Extracapsular cataract removal with insertion of intraocular lens prosthesis (1 stage procedure), manual or mechanical technique (eg, irrigation and aspiration or phacoemulsification) (03/17/2016)  Replacement of Left Lens with Synthetic Substitute, Percutaneous Approach (03/17/2016)  Colonoscopy (04/14/2015)  Colonoscopy (04/14/2015)  Colonoscopy, flexible; diagnostic, including collection of  specimen(s) by brushing or washing, when performed (separate procedure) (04/14/2015)  Percutaneous transluminal coronary atherectomy, with intracoronary stent, with coronary angioplasty when performed; each additional branch of a major coronary artery (List separately in addition to code for primary procedure)   Medications  albuterol 2.5 mg/3 mL (0.083%) inhalation solution, 2.5 mg= 3 mL, INH, q6hr, PRN, 3 refills,? ?Not taking: pt stated  aspirin 81 mg oral Delayed Release (EC) tablet, 81 mg= 1 tab(s), Oral, Daily, 4 refills  Astelin 137 mcg/inh nasal spray, 1 spray(s), Nasal, BID  Celestone Soluspan, 9 mg= 1.5 mL, IM, Once-Unscheduled  dextromethorphan-promethazine 15 mg-6.25 mg/5 mL oral syrup, 5 mL, Oral, q6hr  doxycycline hyclate 100 mg oral tablet, 100 mg= 1 tab(s), Oral, BID,? ?Not Taking, Completed Rx  DuoNeb 0.5 mg-2.5 mg/3 mL inhalation solution, 3 mL, INH, QID, PRN, 3 refills  Fish Oil oral capsule, 1 cap(s), Oral, Daily  Flonase 50 mcg/inh nasal spray, 1 spray(s), Nasal, BID, 1 refills  glipiZIDE 10 mg oral tablet, 10 mg= 1 tab(s), Oral, BID, 6 refills  Lidocaine Viscous 2% mucous membrane solution, 15 mL/EA, N/A, Once  losartan 25 mg oral tablet, 25 mg= 1 tab(s), Oral, Daily, 3 refills  metFORMIN 1000 mg oral tablet, 1000 mg= 1 tab(s), Oral, BID, 6 refills  methylPREDNISolone 4 mg oral tab,? ?Not Taking, Completed Rx  metoprolol tartrate 50 mg oral tab, 50 mg= 1 tab(s), Oral, BID, 6 refills  montelukast 10 mg oral TABLET, 10 mg= 1 tab(s), Oral, qPM, 11 refills  montelukast 10 mg oral TABLET, 10 mg= 1 tab(s), Oral, Daily, 3 refills  Nebulizer Machine, See Instructions  Nitrostat 0.4 mg sublingual tab, 0.4 mg= 1 tab(s), SL, q5min, PRN, 3 refills  simvastatin 40 mg oral tablet, See Instructions, 6 refills  Tessalon 200 mg oral capsule, 200 mg= 1 cap(s), Oral, TID, PRN, 1 refills  Ventolin HFA 90 mcg/inh inhalation aerosol, 2 puff(s), INH, q4hr, PRN, 3 refills  Xyzal 5 mg oral tablet, 5 mg= 1 tab(s), Oral,  qPM, 2 refills  Allergies  No Known Allergies  Social History  Abuse/Neglect  No, 11/19/2019  No, 10/19/2019  Alcohol - Denies Alcohol Use, 08/14/2014  Past, Beer, Wine, Started age 17 Years. Stopped age 48 Years., 08/15/2018  Employment/School  Employed, Work/School description: fisherman. Highest education level: High school. Operates hazardous equipment: No., 06/11/2015  Exercise  Exercise frequency: 5-6 times/week. Self assessment: Good condition. Exercise type: Walking., 03/19/2015  Home/Environment  Lives with Spouse. Living situation: Home/Independent. Alcohol abuse in household: No. Substance abuse in household: No. Smoker in household: No. Feels unsafe at home: No. Safe place to go: Yes. Family/Friends available for support: Yes., 10/29/2018  Lives with Spouse. Living situation: Home/Independent. Alcohol abuse in household: No. Substance abuse in household: No. Smoker in household: No. Injuries/Abuse/Neglect in household: No. Feels unsafe at home: No. Safe place to go: Yes. Family/Friends available for support: Yes. Concern for family members at home: No. Major illness in household: No. Financial concerns: No. TV/Computer concerns: No., 06/11/2015  Nutrition/Health  Regular, Wants to lose weight: Yes., 10/29/2018  Type of diet: regular. Regular, Caffeine intake amount: coffee 3 cups in morning. Wants to lose weight: Yes. Sleeping concerns: No. Feels highly stressed: No., 06/11/2015  Sexual  Sexually active: Yes. Sexually active at age 15 Years. Number of current partners 1. Number of lifetime partners 7. Sexual orientation: Heterosexual. History of sexual abuse: No., 03/03/2015  Substance Use - Denies Substance Abuse, 08/14/2014  Never, 08/15/2018  Tobacco - Denies Tobacco Use, 08/14/2014  Former smoker, quit more than 30 days ago, No, 11/19/2019  Family History  Diabetes mellitus type 2: Mother and Brother.  Heart attack: Father and Brother.  Hypertension.: Father and Brother.  Primary malignant neoplasm  of prostate: Brother.  Schizophrenia: Mother.  Stroke: Mother.  Immunizations  Vaccine Date Status Comments   zoster vaccine, inactivated 05/17/2019 Given    zoster vaccine, inactivated 10/29/2018 Given    influenza virus vaccine, inactivated 10/29/2018 Given    tetanus/diphtheria/pertussis, acel(Tdap) 06/14/2018 Given    influenza virus vaccine, inactivated 10/03/2017 Given    influenza virus vaccine, inactivated 12/15/2016 Given    pneumococcal 13-valent conjugate vaccine 10/28/2015 Given tolerated well   influenza virus vaccine, inactivated 10/28/2015 Given tolerated well   influenza virus vaccine, inactivated 10/22/2014 Recorded    Health Maintenance  Health Maintenance  ???Pending?(in the next year)  ??? ??OverDue  ??? ? ? ?Coronary Artery Disease Maintenance-Antiplatelet Agent Prescribed due??and every?  ??? ? ? ?Coronary Artery Disease Maintenance-Lipid Lowering Therapy due??and every?  ??? ? ? ?Diabetes Maintenance-Fasting Lipid Profile due??06/04/19??and every 1??year(s)  ??? ? ? ?Diabetes Maintenance-Foot Exam due??10/29/19??and every 1??year(s)  ??? ? ? ?ADL Screening due??10/29/19??and every 1??year(s)  ??? ??Due?  ??? ? ? ?Influenza Vaccine due??11/19/19??and every?  ??? ??Due In Future?  ??? ? ? ?Alcohol Misuse Screening not due until??01/01/20??and every 1??year(s)  ??? ? ? ?Obesity Screening not due until??01/01/20??and every 1??year(s)  ??? ? ? ?Diabetes Maintenance-Eye Exam not due until??05/08/20??and every 1??year(s)  ??? ? ? ?Diabetes Maintenance-HgbA1c not due until??05/27/20??and every 1??year(s)  ??? ? ? ?Hypertension Management-BMP not due until??05/27/20??and every 1??year(s)  ??? ? ? ?Depression Screening not due until??06/10/20??and every 1??year(s)  ??? ? ? ?Aspirin Therapy for CVD Prevention not due until??06/11/20??and every 1??year(s)  ??? ? ? ?Blood Pressure Screening not due until??11/18/20??and every 1??year(s)  ??? ? ? ?Body Mass Index Check not due until??11/18/20??and every  1??year(s)  ??? ? ? ?Hypertension Management-Blood Pressure not due until??11/18/20??and every 1??year(s)  ???Satisfied?(in the past 1 year)  ??? ??Satisfied?  ??? ? ? ?Alcohol Misuse Screening on??06/11/19.??Satisfied by Kelsy Negrete MD  ??? ? ? ?Aspirin Therapy for CVD Prevention on??06/11/19.??Satisfied by Kelsy Negrete MD??Reason: Expectation Satisfied Elsewhere  ??? ? ? ?Blood Pressure Screening on??11/19/19.??Satisfied by Fadumo Aviles LPN  ??? ? ? ?Body Mass Index Check on??11/19/19.??Satisfied by Fadumo Aviles LPN  ??? ? ? ?Coronary Artery Disease Maintenance-Lipid Lowering Therapy on??06/11/19.??Satisfied by Kelsy Negrete MD  ??? ? ? ?Depression Screening on??06/11/19.??Satisfied by Gomez Bryan LPN  ??? ? ? ?Diabetes Maintenance-HgbA1c on??05/28/19.??Satisfied by Dae Del Rosario Jr.  ??? ? ? ?Diabetes Screening on??05/28/19.??Satisfied by Dae Del Rosario Jr.  ??? ? ? ?Hypertension Management-Blood Pressure on??11/19/19.??Satisfied by Fadumo Aviles LPN  ??? ? ? ?Influenza Vaccine on??11/19/19.??Satisfied by Fadumo Aviles LPN  ??? ? ? ?Obesity Screening on??11/19/19.??Satisfied by Fadumo Aviles LPN  ??? ? ? ?Zoster Vaccine on??05/17/19.??Satisfied by Nikki Farias LPN  ?  Lab Results  Test Name Test Result Date/Time   Glucose Level 75 11/19/2019 10:31 CST

## 2022-06-15 RX ORDER — LOSARTAN POTASSIUM 25 MG/1
25 TABLET ORAL 2 TIMES DAILY
COMMUNITY
Start: 2022-03-12 | End: 2022-06-15 | Stop reason: SDUPTHER

## 2022-06-15 RX ORDER — LOSARTAN POTASSIUM 25 MG/1
25 TABLET ORAL 2 TIMES DAILY
Qty: 180 TABLET | Refills: 0 | Status: SHIPPED | OUTPATIENT
Start: 2022-06-15 | End: 2022-09-21 | Stop reason: SDUPTHER

## 2022-06-22 ENCOUNTER — OFFICE VISIT (OUTPATIENT)
Dept: INTERNAL MEDICINE | Facility: CLINIC | Age: 67
End: 2022-06-22
Payer: COMMERCIAL

## 2022-06-22 VITALS
WEIGHT: 217.63 LBS | BODY MASS INDEX: 32.98 KG/M2 | HEIGHT: 68 IN | DIASTOLIC BLOOD PRESSURE: 73 MMHG | RESPIRATION RATE: 18 BRPM | HEART RATE: 76 BPM | SYSTOLIC BLOOD PRESSURE: 129 MMHG | TEMPERATURE: 98 F

## 2022-06-22 DIAGNOSIS — I10 HYPERTENSION, UNSPECIFIED TYPE: Primary | ICD-10-CM

## 2022-06-22 DIAGNOSIS — E11.9 TYPE 2 DIABETES MELLITUS WITHOUT COMPLICATION, WITHOUT LONG-TERM CURRENT USE OF INSULIN: ICD-10-CM

## 2022-06-22 DIAGNOSIS — E78.5 HYPERLIPIDEMIA, UNSPECIFIED HYPERLIPIDEMIA TYPE: ICD-10-CM

## 2022-06-22 PROBLEM — I21.9 MYOCARDIAL INFARCTION: Status: ACTIVE | Noted: 2022-06-22

## 2022-06-22 PROBLEM — I25.10 CAD (CORONARY ARTERY DISEASE): Status: ACTIVE | Noted: 2022-06-22

## 2022-06-22 PROCEDURE — 99214 OFFICE O/P EST MOD 30 MIN: CPT | Mod: PBBFAC

## 2022-06-22 RX ORDER — GLIPIZIDE 10 MG/1
10 TABLET ORAL 2 TIMES DAILY
COMMUNITY
Start: 2022-04-14 | End: 2022-09-28 | Stop reason: SDUPTHER

## 2022-06-22 RX ORDER — LISINOPRIL 5 MG/1
5 TABLET ORAL DAILY
COMMUNITY
End: 2022-06-22 | Stop reason: CLARIF

## 2022-06-22 RX ORDER — VIT C/E/ZN/COPPR/LUTEIN/ZEAXAN 250MG-90MG
1000 CAPSULE ORAL DAILY
Qty: 60 CAPSULE | Refills: 5 | Status: SHIPPED | OUTPATIENT
Start: 2022-06-22

## 2022-06-22 RX ORDER — FLUTICASONE PROPIONATE 50 MCG
SPRAY, SUSPENSION (ML) NASAL
COMMUNITY
Start: 2021-09-13 | End: 2023-01-03 | Stop reason: SDUPTHER

## 2022-06-22 RX ORDER — METFORMIN HYDROCHLORIDE 1000 MG/1
1000 TABLET ORAL 2 TIMES DAILY
COMMUNITY
Start: 2022-04-22 | End: 2022-11-08 | Stop reason: SDUPTHER

## 2022-06-22 RX ORDER — SITAGLIPTIN 100 MG/1
100 TABLET, FILM COATED ORAL DAILY
COMMUNITY
Start: 2022-05-14 | End: 2022-06-27 | Stop reason: SDUPTHER

## 2022-06-22 RX ORDER — METOPROLOL TARTRATE 50 MG/1
50 TABLET ORAL 2 TIMES DAILY
COMMUNITY
Start: 2022-06-08 | End: 2023-01-03 | Stop reason: SDUPTHER

## 2022-06-22 RX ORDER — ATORVASTATIN CALCIUM 40 MG/1
40 TABLET, FILM COATED ORAL DAILY
COMMUNITY
Start: 2022-03-26 | End: 2022-08-16 | Stop reason: SDUPTHER

## 2022-06-22 RX ORDER — ASPIRIN 81 MG/1
81 TABLET ORAL DAILY
COMMUNITY
Start: 2022-01-25 | End: 2023-01-03 | Stop reason: SDUPTHER

## 2022-06-22 RX ORDER — OMEGA-3 FATTY ACIDS 1000 MG
1 CAPSULE ORAL DAILY
COMMUNITY
End: 2023-01-03 | Stop reason: SDUPTHER

## 2022-06-22 NOTE — PROGRESS NOTES
[facility]    Subjective:      66 year old  male Diabetes, HTN, CAD S/P stent, HLD  No complaints at this time.  Denies any chest pain, shortness of breath, fever, chills, nausea, vomiting, abdominal pain  or any weakness at this time.               Review of Systems:  10 point ROS negative except for HPI    Objective:   Vital Signs:  Vitals:    06/22/22 1446   BP: 129/73   Pulse: 76   Resp: 18   Temp: 97.9 °F (36.6 °C)          Body mass index is 33.09 kg/m².     General: Alert and oriented, No acute distress.  Respiratory: Lungs are clear to auscultation, Respirations are non-labored, Breath sounds  are equal, Symmetrical chest wall expansion, No chest wall tenderness.  Cardiovascular: Normal rate, Regular rhythm, No murmur, No gallop, Good pulses equal in  all extremities, Normal peripheral perfusion, No edema.  Musculoskeletal:Normal range of motion, Normal strength, No tenderness, No swelling,  No deformity, Normal gait.  Integumentary: Warm, Pink, Intact, Moist, No pallor, No rash.  Cognition and Speech: Oriented, Speech clear and coherent, Functional cognition intact.        Laboratory:  Lab Results   Component Value Date    WBC 8.7 09/03/2021    HGB 14.6 09/03/2021    HCT 47.1 09/03/2021     09/03/2021    MCV 93.3 09/03/2021    RDW 15.1 (H) 09/03/2021    Lab Results   Component Value Date     09/03/2021    K 4.6 09/03/2021    CO2 25 09/03/2021    BUN 6.5 (L) 09/03/2021    CREATININE 1.02 09/03/2021    CALCIUM 8.9 09/03/2021      Lab Results   Component Value Date    HGBA1C 6.5 09/03/2021    .8 09/03/2021    CREATININE 1.02 09/03/2021    CREATRANDUR 149.6 09/03/2021    No results found for: TSH, PQXHRR2GMJQ, D5CUUXH, T6KPRPU, THYROIDAB             Current Medications:  Current Outpatient Medications   Medication Instructions    aspirin (ECOTRIN) 81 mg, Oral, Daily    atorvastatin (LIPITOR) 40 mg, Oral, Daily    fluticasone propionate (FLONASE) 50  mcg/actuation nasal spray   See Instructions, USE 1 SPRAY(S) IN EACH NOSTRIL TWICE DAILY FOR 30 DAYS, # 16 gm, 0 Refill(s), Pharmacy: [id number], 174, cm, Height/Length Dosing, 09/13/21 10:53:00 CDT, 94.3, kg, Weight Dosing, 09/13/21 10:53:00 CDT    glipiZIDE (GLUCOTROL) 10 mg, Oral, 2 times daily    JANUVIA 100 mg, Oral, Daily    losartan (COZAAR) 25 mg, Oral, 2 times daily    metFORMIN (GLUCOPHAGE) 1,000 mg, Oral, 2 times daily    metoprolol tartrate (LOPRESSOR) 50 mg, Oral, 2 times daily    omega-3 fatty acids 1 g, Oral, Daily        Assessment and Plan:       Left heel pain  Likely Plantar fascitis -resolved  -Left heel pain  - Xray of the left foot neg    Diabetes mellitus type II - Controlled  -POC ~7.1 3/22  -continue Januvia (sitagliptin) 100mg PO daily  -continue metformin 1000mg BID and glipizide 10mg BID  - Repeat A1c next visit, will consider going up on meds if A1c increases   Advised BS log     Hypertension  -normotensive  -continue metoprolol tartrate 50mg BID & losartan to 25mg BID (convenient dosing as  metoprolol already given BID)    L Hydrocele s/p surgical correction  -resolved  discharged from urology    No complaints at this time     CAD s/p coronary stent in 2003  -well controlled, MI was 12 years ago, denies any current chest pain  -continue ASA, statin, beta blocker; held ACE-i at this time due to cough     Dyslipidemia  -on atorvastatin 40mg qd  -ASCVD score of 23.1% currently, 7.5% if optimized medically  -Lipid panels pending    Diverticulosis - no complaints at [facility] time  -last episode of diverticulitis was in March 2016  -encouraged high fiber diet, and weight loss  -last colonoscopy was 2015    L eye cataract s/p cataract surgery  following up with ophthalmology [facility] clinic.  follows ophthalmology clinic      Health Maintenance  -Lung Ca. Screening: discuss risks/benefits, pt chose to defer at this time, will discuss at  later visit  -Colon Ca. Screening: Colonscopy due  2025  -AAA: 12/21 - neg  -Prostate: discussed PSA. Pt chose no surveillance at this time but will consider for future  visits     Health Maintenance   Topic Date Due    Hepatitis C Screening  Never done    Lipid Panel  09/03/2026    TETANUS VACCINE  06/14/2028    Abdominal Aortic Aneurysm Screening  Completed                  Daphney Marino DO   [facility]      Daphney Marino DO

## 2022-06-23 NOTE — PROGRESS NOTES
I have reviewed and concur with the resident's history, physical, assessment, and plan.  I have discussed with him all issues related to the diagnosis, workup and treatment plan.Care provided as reasonable and necessary.    Georges Mcdonough MD  [facility]

## 2022-06-28 RX ORDER — SITAGLIPTIN 100 MG/1
100 TABLET, FILM COATED ORAL DAILY
Qty: 30 TABLET | Refills: 2 | Status: SHIPPED | OUTPATIENT
Start: 2022-06-28 | End: 2022-09-28 | Stop reason: SDUPTHER

## 2022-08-16 DIAGNOSIS — E78.5 HYPERLIPIDEMIA, UNSPECIFIED HYPERLIPIDEMIA TYPE: Primary | ICD-10-CM

## 2022-08-17 RX ORDER — ATORVASTATIN CALCIUM 40 MG/1
40 TABLET, FILM COATED ORAL DAILY
Qty: 90 TABLET | Refills: 0 | Status: SHIPPED | OUTPATIENT
Start: 2022-08-17 | End: 2022-11-03 | Stop reason: SDUPTHER

## 2022-09-23 RX ORDER — LOSARTAN POTASSIUM 25 MG/1
25 TABLET ORAL 2 TIMES DAILY
Qty: 180 TABLET | Refills: 0 | Status: SHIPPED | OUTPATIENT
Start: 2022-09-23 | End: 2022-12-16 | Stop reason: SDUPTHER

## 2022-09-28 ENCOUNTER — LAB VISIT (OUTPATIENT)
Dept: LAB | Facility: HOSPITAL | Age: 67
End: 2022-09-28
Attending: STUDENT IN AN ORGANIZED HEALTH CARE EDUCATION/TRAINING PROGRAM
Payer: COMMERCIAL

## 2022-09-28 DIAGNOSIS — I10 HYPERTENSION, UNSPECIFIED TYPE: ICD-10-CM

## 2022-09-28 DIAGNOSIS — E11.9 TYPE 2 DIABETES MELLITUS WITHOUT COMPLICATION, WITHOUT LONG-TERM CURRENT USE OF INSULIN: ICD-10-CM

## 2022-09-28 DIAGNOSIS — E78.5 HYPERLIPIDEMIA, UNSPECIFIED HYPERLIPIDEMIA TYPE: ICD-10-CM

## 2022-09-28 LAB
ALBUMIN SERPL-MCNC: 3.9 GM/DL (ref 3.4–4.8)
ALBUMIN/GLOB SERPL: 1.1 RATIO (ref 1.1–2)
ALP SERPL-CCNC: 84 UNIT/L (ref 40–150)
ALT SERPL-CCNC: 23 UNIT/L (ref 0–55)
AST SERPL-CCNC: 15 UNIT/L (ref 5–34)
BASOPHILS # BLD AUTO: 0.05 X10(3)/MCL (ref 0–0.2)
BASOPHILS NFR BLD AUTO: 0.7 %
BILIRUBIN DIRECT+TOT PNL SERPL-MCNC: 0.5 MG/DL
BUN SERPL-MCNC: 8.3 MG/DL (ref 8.4–25.7)
CALCIUM SERPL-MCNC: 9.1 MG/DL (ref 8.8–10)
CHLORIDE SERPL-SCNC: 107 MMOL/L (ref 98–107)
CO2 SERPL-SCNC: 28 MMOL/L (ref 23–31)
CREAT SERPL-MCNC: 0.9 MG/DL (ref 0.73–1.18)
DEPRECATED CALCIDIOL+CALCIFEROL SERPL-MC: 35.1 NG/ML (ref 30–80)
EOSINOPHIL # BLD AUTO: 0.27 X10(3)/MCL (ref 0–0.9)
EOSINOPHIL NFR BLD AUTO: 3.6 %
ERYTHROCYTE [DISTWIDTH] IN BLOOD BY AUTOMATED COUNT: 15 % (ref 11.5–17)
EST. AVERAGE GLUCOSE BLD GHB EST-MCNC: 148.5 MG/DL
GFR SERPLBLD CREATININE-BSD FMLA CKD-EPI: >60 MLS/MIN/1.73/M2
GLOBULIN SER-MCNC: 3.4 GM/DL (ref 2.4–3.5)
GLUCOSE SERPL-MCNC: 128 MG/DL (ref 82–115)
HBA1C MFR BLD: 6.8 %
HCT VFR BLD AUTO: 48.6 % (ref 42–52)
HGB BLD-MCNC: 15 GM/DL (ref 14–18)
IMM GRANULOCYTES # BLD AUTO: 0.02 X10(3)/MCL (ref 0–0.04)
IMM GRANULOCYTES NFR BLD AUTO: 0.3 %
LYMPHOCYTES # BLD AUTO: 1.82 X10(3)/MCL (ref 0.6–4.6)
LYMPHOCYTES NFR BLD AUTO: 24.3 %
MCH RBC QN AUTO: 28.3 PG (ref 27–31)
MCHC RBC AUTO-ENTMCNC: 30.9 MG/DL (ref 33–36)
MCV RBC AUTO: 91.7 FL (ref 80–94)
MONOCYTES # BLD AUTO: 0.64 X10(3)/MCL (ref 0.1–1.3)
MONOCYTES NFR BLD AUTO: 8.5 %
NEUTROPHILS # BLD AUTO: 4.7 X10(3)/MCL (ref 2.1–9.2)
NEUTROPHILS NFR BLD AUTO: 62.6 %
NRBC BLD AUTO-RTO: 0 %
PLATELET # BLD AUTO: 298 X10(3)/MCL (ref 130–400)
PMV BLD AUTO: 10.5 FL (ref 7.4–10.4)
POTASSIUM SERPL-SCNC: 4.4 MMOL/L (ref 3.5–5.1)
PROT SERPL-MCNC: 7.3 GM/DL (ref 5.8–7.6)
RBC # BLD AUTO: 5.3 X10(6)/MCL (ref 4.7–6.1)
SODIUM SERPL-SCNC: 142 MMOL/L (ref 136–145)
WBC # SPEC AUTO: 7.5 X10(3)/MCL (ref 4.5–11.5)

## 2022-09-28 PROCEDURE — 85025 COMPLETE CBC W/AUTO DIFF WBC: CPT

## 2022-09-28 PROCEDURE — 83036 HEMOGLOBIN GLYCOSYLATED A1C: CPT

## 2022-09-28 PROCEDURE — 80053 COMPREHEN METABOLIC PANEL: CPT

## 2022-09-28 PROCEDURE — 36415 COLL VENOUS BLD VENIPUNCTURE: CPT

## 2022-09-28 PROCEDURE — 82306 VITAMIN D 25 HYDROXY: CPT

## 2022-09-28 RX ORDER — GLIPIZIDE 10 MG/1
10 TABLET ORAL 2 TIMES DAILY
Qty: 60 TABLET | Refills: 2 | Status: SHIPPED | OUTPATIENT
Start: 2022-09-28 | End: 2022-12-27 | Stop reason: SDUPTHER

## 2022-09-28 RX ORDER — SITAGLIPTIN 100 MG/1
100 TABLET, FILM COATED ORAL DAILY
Qty: 30 TABLET | Refills: 2 | Status: SHIPPED | OUTPATIENT
Start: 2022-09-28 | End: 2022-12-27 | Stop reason: SDUPTHER

## 2022-09-28 NOTE — TELEPHONE ENCOUNTER
----- Message from Nita Bridges sent at 9/28/2022  7:24 AM CDT -----  Regarding: Ned- Medication Refills  Refill Request    Provider: Dr Marino    Last Visit: 06/22/2022    Next Visit: 10/11/2022    Patient's Contact #: [phone]    Medication Name & Dose: Glipizide                                             Januvia      Preferred Pharmacy: [facility]     Comment: Does not have enough until his appointment     Thanks for all that you do,  Nita

## 2022-10-11 ENCOUNTER — OFFICE VISIT (OUTPATIENT)
Dept: INTERNAL MEDICINE | Facility: CLINIC | Age: 67
End: 2022-10-11
Payer: COMMERCIAL

## 2022-10-11 VITALS
BODY MASS INDEX: 32.01 KG/M2 | HEART RATE: 65 BPM | HEIGHT: 68 IN | TEMPERATURE: 98 F | WEIGHT: 211.19 LBS | SYSTOLIC BLOOD PRESSURE: 133 MMHG | DIASTOLIC BLOOD PRESSURE: 73 MMHG | RESPIRATION RATE: 18 BRPM

## 2022-10-11 DIAGNOSIS — E11.9 TYPE 2 DIABETES MELLITUS WITHOUT COMPLICATION, WITHOUT LONG-TERM CURRENT USE OF INSULIN: ICD-10-CM

## 2022-10-11 DIAGNOSIS — Z23 NEED FOR INFLUENZA VACCINATION: Primary | ICD-10-CM

## 2022-10-11 PROCEDURE — G0008 ADMIN INFLUENZA VIRUS VAC: HCPCS | Mod: PBBFAC

## 2022-10-11 PROCEDURE — 90653 IIV ADJUVANT VACCINE IM: CPT | Mod: PBBFAC

## 2022-10-11 PROCEDURE — 99214 OFFICE O/P EST MOD 30 MIN: CPT | Mod: PBBFAC,25

## 2022-10-11 NOTE — PROGRESS NOTES
[facility]    Subjective:      66 year old  male Diabetes, HTN, CAD S/P stent, HLD  No complaints at this time.  Denies any chest pain, shortness of breath, fever, chills, nausea, vomiting, abdominal pain  or any weakness at this time.               Review of Systems:  10 point ROS negative except for HPI    Objective:   Vital Signs:  Vitals:    10/11/22 0746   BP: 133/73   Pulse: 65   Resp: 18   Temp: 97.9 °F (36.6 °C)          Body mass index is 32.11 kg/m².     General: Alert and oriented, No acute distress.  Respiratory: Lungs are clear to auscultation, Respirations are non-labored, Breath sounds  are equal, Symmetrical chest wall expansion, No chest wall tenderness.  Cardiovascular: Normal rate, Regular rhythm, No murmur, No gallop, Good pulses equal in  all extremities, Normal peripheral perfusion, No edema.  Musculoskeletal:Normal range of motion, Normal strength, No tenderness, No swelling,  No deformity, Normal gait.  Integumentary: Warm, Pink, Intact, Moist, No pallor, No rash.  Cognition and Speech: Oriented, Speech clear and coherent, Functional cognition intact.        Laboratory:  Lab Results   Component Value Date    WBC 7.5 09/28/2022    HGB 15.0 09/28/2022    HCT 48.6 09/28/2022     09/28/2022    MCV 91.7 09/28/2022    RDW 15.0 09/28/2022    Lab Results   Component Value Date     09/28/2022    K 4.4 09/28/2022    CO2 28 09/28/2022    BUN 8.3 (L) 09/28/2022    CREATININE 0.90 09/28/2022    CALCIUM 9.1 09/28/2022      Lab Results   Component Value Date    HGBA1C 6.8 09/28/2022    .5 09/28/2022    CREATININE 0.90 09/28/2022    CREATRANDUR 149.6 09/03/2021    No results found for: TSH, [id number], W1UCEOX, R1EREYF, THYROIDAB             Current Medications:  Current Outpatient Medications   Medication Instructions    aspirin (ECOTRIN) 81 mg, Oral, Daily    atorvastatin (LIPITOR) 40 mg, Oral, Daily    cholecalciferol (vitamin D3) (VITAMIN D3)  1,000 Units, Oral, Daily    fluticasone propionate (FLONASE) 50 mcg/actuation nasal spray   See Instructions, USE 1 SPRAY(S) IN EACH NOSTRIL TWICE DAILY FOR 30 DAYS, # 16 gm, 0 Refill(s), Pharmacy: [facility], 174, cm, Height/Length Dosing, 09/13/21 10:53:00 CDT, 94.3, kg, Weight Dosing, 09/13/21 10:53:00 CDT    glipiZIDE (GLUCOTROL) 10 mg, Oral, 2 times daily    JANUVIA 100 mg, Oral, Daily    losartan (COZAAR) 25 mg, Oral, 2 times daily    metFORMIN (GLUCOPHAGE) 1,000 mg, Oral, 2 times daily    metoprolol tartrate (LOPRESSOR) 50 mg, Oral, 2 times daily    omega-3 fatty acids 1 g, Oral, Daily        Assessment and Plan:       Left heel pain  Likely Plantar fascitis -resolved  -Left heel pain  - Xray of the left foot neg    Diabetes mellitus type II - Controlled  -POC ~6.8 6/22  -continue Januvia (sitagliptin) 100mg PO daily  -continue metformin 1000mg BID and glipizide 10mg BID   Advised BS log     Hypertension  -normotensive  -continue metoprolol tartrate 50mg BID & losartan to 25mg BID (convenient dosing as  metoprolol already given BID)    L Hydrocele s/p surgical correction  -resolved  discharged from urology    No complaints at this time     CAD s/p coronary stent in 2003  -well controlled, MI was 12 years ago, denies any current chest pain  -continue ASA, statin, beta blocker; held ACE-i at this time due to cough     Dyslipidemia  -ASCVD score of 23.1% currently, 7.5% if optimized medically  -Lipid panels wnl 9/21  Continue atorvastatin 40 daily     Diverticulosis - no complaints at [facility] time  -last episode of diverticulitis was in March 2016  -encouraged high fiber diet, and weight loss  -last colonoscopy was 2015    L eye cataract s/p cataract surgery  following up with ophthalmology [facility] clinic.  follows ophthalmology clinic      Health Maintenance  Flu shot today  DM foot exam and eye uptd, next due  4/23  -Lung Ca. Screening: discuss risks/benefits, pt chose to defer at this time, will discuss  at  later visit  -Colon Ca. Screening: Colonscopy due 2025  -AAA: 12/21 - neg    Health Maintenance   Topic Date Due    Hepatitis C Screening  Never done    Foot Exam  Never done    Eye Exam  Never done    Lipid Panel  09/03/2022    Hemoglobin A1c  03/28/2023    TETANUS VACCINE  06/14/2028    Abdominal Aortic Aneurysm Screening  Completed          RTC in 3 months         Daphney Marino DO   [facility]      Daphney Marino DO

## 2022-11-03 DIAGNOSIS — E78.5 HYPERLIPIDEMIA, UNSPECIFIED HYPERLIPIDEMIA TYPE: ICD-10-CM

## 2022-11-03 RX ORDER — ATORVASTATIN CALCIUM 40 MG/1
40 TABLET, FILM COATED ORAL DAILY
Qty: 90 TABLET | Refills: 1 | Status: SHIPPED | OUTPATIENT
Start: 2022-11-03 | End: 2023-01-03 | Stop reason: SDUPTHER

## 2022-11-08 DIAGNOSIS — E11.9 TYPE 2 DIABETES MELLITUS WITHOUT COMPLICATION, WITHOUT LONG-TERM CURRENT USE OF INSULIN: Primary | ICD-10-CM

## 2022-11-08 RX ORDER — METFORMIN HYDROCHLORIDE 1000 MG/1
1000 TABLET ORAL 2 TIMES DAILY
Qty: 30 TABLET | Refills: 2 | Status: SHIPPED | OUTPATIENT
Start: 2022-11-08 | End: 2022-12-22 | Stop reason: SDUPTHER

## 2022-12-16 DIAGNOSIS — I10 HYPERTENSION, UNSPECIFIED TYPE: Primary | ICD-10-CM

## 2022-12-18 RX ORDER — LOSARTAN POTASSIUM 25 MG/1
25 TABLET ORAL 2 TIMES DAILY
Qty: 180 TABLET | Refills: 0 | Status: SHIPPED | OUTPATIENT
Start: 2022-12-18 | End: 2023-01-03 | Stop reason: SDUPTHER

## 2022-12-22 ENCOUNTER — LAB VISIT (OUTPATIENT)
Dept: LAB | Facility: HOSPITAL | Age: 67
End: 2022-12-22
Attending: STUDENT IN AN ORGANIZED HEALTH CARE EDUCATION/TRAINING PROGRAM
Payer: COMMERCIAL

## 2022-12-22 DIAGNOSIS — E11.9 TYPE 2 DIABETES MELLITUS WITHOUT COMPLICATION, WITHOUT LONG-TERM CURRENT USE OF INSULIN: ICD-10-CM

## 2022-12-22 LAB
CHOLEST SERPL-MCNC: 112 MG/DL
CHOLEST/HDLC SERPL: 3 {RATIO} (ref 0–5)
EST. AVERAGE GLUCOSE BLD GHB EST-MCNC: 154.2 MG/DL
HBA1C MFR BLD: 7 %
HDLC SERPL-MCNC: 33 MG/DL (ref 35–60)
LDLC SERPL CALC-MCNC: 34 MG/DL (ref 50–140)
TRIGL SERPL-MCNC: 223 MG/DL (ref 34–140)
VLDLC SERPL CALC-MCNC: 45 MG/DL

## 2022-12-22 PROCEDURE — 36415 COLL VENOUS BLD VENIPUNCTURE: CPT

## 2022-12-22 PROCEDURE — 80061 LIPID PANEL: CPT

## 2022-12-22 PROCEDURE — 83036 HEMOGLOBIN GLYCOSYLATED A1C: CPT

## 2022-12-24 RX ORDER — METFORMIN HYDROCHLORIDE 1000 MG/1
1000 TABLET ORAL 2 TIMES DAILY
Qty: 30 TABLET | Refills: 2 | Status: SHIPPED | OUTPATIENT
Start: 2022-12-24 | End: 2023-01-03 | Stop reason: SDUPTHER

## 2022-12-27 ENCOUNTER — DOCUMENTATION ONLY (OUTPATIENT)
Dept: FAMILY MEDICINE | Facility: CLINIC | Age: 67
End: 2022-12-27
Payer: COMMERCIAL

## 2022-12-27 ENCOUNTER — PATIENT OUTREACH (OUTPATIENT)
Dept: ADMINISTRATIVE | Facility: HOSPITAL | Age: 67
End: 2022-12-27
Payer: COMMERCIAL

## 2022-12-27 DIAGNOSIS — E11.9 TYPE 2 DIABETES MELLITUS WITHOUT COMPLICATION, WITHOUT LONG-TERM CURRENT USE OF INSULIN: Primary | ICD-10-CM

## 2022-12-28 RX ORDER — SITAGLIPTIN 100 MG/1
100 TABLET, FILM COATED ORAL DAILY
Qty: 30 TABLET | Refills: 2 | Status: SHIPPED | OUTPATIENT
Start: 2022-12-28 | End: 2023-01-03 | Stop reason: SDUPTHER

## 2022-12-28 RX ORDER — GLIPIZIDE 10 MG/1
10 TABLET ORAL 2 TIMES DAILY
Qty: 60 TABLET | Refills: 2 | Status: SHIPPED | OUTPATIENT
Start: 2022-12-28 | End: 2023-01-03 | Stop reason: SDUPTHER

## 2023-01-03 ENCOUNTER — OFFICE VISIT (OUTPATIENT)
Dept: INTERNAL MEDICINE | Facility: CLINIC | Age: 68
End: 2023-01-03
Payer: COMMERCIAL

## 2023-01-03 VITALS
WEIGHT: 214.38 LBS | SYSTOLIC BLOOD PRESSURE: 138 MMHG | TEMPERATURE: 98 F | DIASTOLIC BLOOD PRESSURE: 73 MMHG | BODY MASS INDEX: 32.49 KG/M2 | OXYGEN SATURATION: 98 % | HEART RATE: 69 BPM | RESPIRATION RATE: 18 BRPM | HEIGHT: 68 IN

## 2023-01-03 DIAGNOSIS — E55.9 VITAMIN D DEFICIENCY: Primary | ICD-10-CM

## 2023-01-03 DIAGNOSIS — I10 HYPERTENSION, UNSPECIFIED TYPE: ICD-10-CM

## 2023-01-03 DIAGNOSIS — E78.5 HYPERLIPIDEMIA, UNSPECIFIED HYPERLIPIDEMIA TYPE: ICD-10-CM

## 2023-01-03 DIAGNOSIS — E11.9 TYPE 2 DIABETES MELLITUS WITHOUT COMPLICATION, WITHOUT LONG-TERM CURRENT USE OF INSULIN: ICD-10-CM

## 2023-01-03 PROCEDURE — 99213 OFFICE O/P EST LOW 20 MIN: CPT | Mod: PBBFAC

## 2023-01-03 RX ORDER — METFORMIN HYDROCHLORIDE 1000 MG/1
1000 TABLET ORAL 2 TIMES DAILY
Qty: 180 TABLET | Refills: 3 | Status: SHIPPED | OUTPATIENT
Start: 2023-01-03 | End: 2024-01-25 | Stop reason: SDUPTHER

## 2023-01-03 RX ORDER — OMEGA-3 FATTY ACIDS 1000 MG
1000 CAPSULE ORAL DAILY
Qty: 90 CAPSULE | Refills: 2 | Status: SHIPPED | OUTPATIENT
Start: 2023-01-03 | End: 2023-12-05

## 2023-01-03 RX ORDER — FLUTICASONE PROPIONATE 50 MCG
SPRAY, SUSPENSION (ML) NASAL
Qty: 16 G | Refills: 0 | Status: SHIPPED | OUTPATIENT
Start: 2023-01-03 | End: 2023-05-12 | Stop reason: SDUPTHER

## 2023-01-03 RX ORDER — ASPIRIN 81 MG/1
81 TABLET ORAL DAILY
Qty: 90 TABLET | Refills: 3 | Status: SHIPPED | OUTPATIENT
Start: 2023-01-03

## 2023-01-03 RX ORDER — GLIPIZIDE 10 MG/1
10 TABLET ORAL 2 TIMES DAILY
Qty: 90 TABLET | Refills: 3 | Status: SHIPPED | OUTPATIENT
Start: 2023-01-03 | End: 2023-08-15 | Stop reason: SDUPTHER

## 2023-01-03 RX ORDER — ICOSAPENT ETHYL 1000 MG/1
1 CAPSULE ORAL DAILY
COMMUNITY
End: 2023-01-03 | Stop reason: SDUPTHER

## 2023-01-03 RX ORDER — ATORVASTATIN CALCIUM 40 MG/1
40 TABLET, FILM COATED ORAL DAILY
Qty: 90 TABLET | Refills: 3 | Status: SHIPPED | OUTPATIENT
Start: 2023-01-03 | End: 2024-01-25 | Stop reason: SDUPTHER

## 2023-01-03 RX ORDER — METOPROLOL TARTRATE 50 MG/1
50 TABLET ORAL 2 TIMES DAILY
Qty: 180 TABLET | Refills: 3 | Status: SHIPPED | OUTPATIENT
Start: 2023-01-03 | End: 2024-01-25 | Stop reason: SDUPTHER

## 2023-01-03 RX ORDER — LOSARTAN POTASSIUM 25 MG/1
25 TABLET ORAL 2 TIMES DAILY
Qty: 180 TABLET | Refills: 3 | Status: SHIPPED | OUTPATIENT
Start: 2023-01-03 | End: 2024-03-18 | Stop reason: SDUPTHER

## 2023-01-03 RX ORDER — SITAGLIPTIN 100 MG/1
100 TABLET, FILM COATED ORAL DAILY
Qty: 90 TABLET | Refills: 3 | Status: SHIPPED | OUTPATIENT
Start: 2023-01-03 | End: 2024-01-25 | Stop reason: SDUPTHER

## 2023-01-03 NOTE — PROGRESS NOTES
[facility] Internal Medicine Resident Clinic    Subjective:      66 year old  male Diabetes, HTN, CAD S/P stent, HLD. Stated that he had nasal congestion for last week and wants to try Flonase. Also stated he had right knee pain that strated after he jump off a barrel a week ago. He stated it has improved over the days. He is takign tylenol for it as this time.   Denies any chest pain, shortness of breath, fever, chills, nausea, vomiting, abdominal pain  or any weakness at this time.               Review of Systems:  10 point ROS negative except for HPI    Objective:   Vital Signs:  Vitals:    01/03/23 0748   BP: 138/73   Pulse: 69   Resp: 18   Temp: 97.7 °F (36.5 °C)          Body mass index is 32.6 kg/m².     General: Alert and oriented, No acute distress.  Respiratory: Lungs are clear to auscultation, Respirations are non-labored, Breath sounds  are equal, Symmetrical chest wall expansion, No chest wall tenderness.  Cardiovascular: Normal rate, Regular rhythm, No murmur, No gallop, Good pulses equal in  all extremities, Normal peripheral perfusion, No edema.  Musculoskeletal:Normal range of motion, Normal strength, No tenderness, No swelling,  No deformity, Normal gait.  Integumentary: Warm, Pink, Intact, Moist, No pallor, No rash.  Cognition and Speech: Oriented, Speech clear and coherent, Functional cognition intact.        Laboratory:  Lab Results   Component Value Date    WBC 7.5 09/28/2022    HGB 15.0 09/28/2022    HCT 48.6 09/28/2022     09/28/2022    MCV 91.7 09/28/2022    RDW 15.0 09/28/2022    Lab Results   Component Value Date     09/28/2022    K 4.4 09/28/2022    CO2 28 09/28/2022    BUN 8.3 (L) 09/28/2022    CREATININE 0.90 09/28/2022    CALCIUM 9.1 09/28/2022      Lab Results   Component Value Date    HGBA1C 7.0 12/22/2022    .2 12/22/2022    CREATININE 0.90 09/28/2022    CREATRANDUR 149.6 09/03/2021    No results found for: TSH, [id number], R0YQSQK, Y3ADPWZ, THYROIDAB              Current Medications:  Current Outpatient Medications   Medication Instructions    aspirin (ECOTRIN) 81 mg, Oral, Daily    atorvastatin (LIPITOR) 40 mg, Oral, Daily    cholecalciferol (vitamin D3) (VITAMIN D3) 1,000 Units, Oral, Daily    fluticasone propionate (FLONASE) 50 mcg/actuation nasal spray   See Instructions, USE 1 SPRAY(S) IN EACH NOSTRIL TWICE DAILY FOR 30 DAYS, # 16 gm, 0 Refill(s), Pharmacy: [facility], 174, cm, Height/Length Dosing, 09/13/21 10:53:00 CDT, 94.3, kg, Weight Dosing, 09/13/21 10:53:00 CDT    glipiZIDE (GLUCOTROL) 10 mg, Oral, 2 times daily    JANUVIA 100 mg, Oral, Daily    losartan (COZAAR) 25 mg, Oral, 2 times daily    metFORMIN (GLUCOPHAGE) 1,000 mg, Oral, 2 times daily    metoprolol tartrate (LOPRESSOR) 50 mg, Oral, 2 times daily    omega-3 fatty acids 1 g, Oral, Daily        Assessment and Plan:       Left heel pain  Likely Plantar fascitis -resolved  -Left heel pain  - Xray of the left foot neg    Diabetes mellitus type II - Controlled  -POC ~7.0 on 1/3/23  -continue Januvia (sitagliptin) 100mg PO daily  -continue metformin 1000mg BID and glipizide 10mg BID   Advised BS log     Hypertension  -normotensive  -continue metoprolol tartrate 50mg BID & losartan to 25mg BID (convenient dosing as  metoprolol already given BID)    L Hydrocele s/p surgical correction  -resolved  discharged from urology    No complaints at this time     CAD s/p coronary stent in 2003  -well controlled, MI was 12 years ago, denies any current chest pain  -continue ASA, statin, beta blocker; held ACE-i at this time due to cough     Dyslipidemia  -Lipid panels on 12/22 with LDL of 34 and triglycerides  of 223  Continue atorvastatin 40 daily and fish omega   - Advised healthy diet and lifestyle     Diverticulosis - no complaints at \[facility]\"" time  -last episode of diverticulitis was in March 2016  -encouraged high fiber diet, and weight loss  -last colonoscopy was 2015    L eye cataract s/p cataract  surgery  following up with ophthalmology [facility] clinic.  follows ophthalmology clinic      Health Maintenance  Flu shot   DM foot exam and eye uptd, next due    -Lung Ca. Screenin pack year but quit more than 20 years ago ; will defer at this time   -Colon Ca. Screening: Colonscopy due   -AAA:  - neg    Health Maintenance   Topic Date Due    Hepatitis C Screening  Never done    Foot Exam  Never done    Eye Exam  2022    Hemoglobin A1c  2023    Lipid Panel  2023    TETANUS VACCINE  2028    Abdominal Aortic Aneurysm Screening  Completed          RTC in 4 months   Labs : A1c and vit d         Daphney Marino DO   [facility] Internal Medicine Resident Clinic

## 2023-01-04 RX ORDER — ICOSAPENT ETHYL 1000 MG/1
1 CAPSULE ORAL DAILY
Qty: 90 CAPSULE | Refills: 1 | Status: SHIPPED | OUTPATIENT
Start: 2023-01-04 | End: 2023-06-22 | Stop reason: SDUPTHER

## 2023-05-11 ENCOUNTER — LAB VISIT (OUTPATIENT)
Dept: LAB | Facility: HOSPITAL | Age: 68
End: 2023-05-11
Attending: STUDENT IN AN ORGANIZED HEALTH CARE EDUCATION/TRAINING PROGRAM
Payer: COMMERCIAL

## 2023-05-11 DIAGNOSIS — E11.9 TYPE 2 DIABETES MELLITUS WITHOUT COMPLICATION, WITHOUT LONG-TERM CURRENT USE OF INSULIN: ICD-10-CM

## 2023-05-11 DIAGNOSIS — I10 HYPERTENSION, UNSPECIFIED TYPE: ICD-10-CM

## 2023-05-11 DIAGNOSIS — E55.9 VITAMIN D DEFICIENCY: ICD-10-CM

## 2023-05-11 LAB
DEPRECATED CALCIDIOL+CALCIFEROL SERPL-MC: 35.4 NG/ML (ref 30–80)
EST. AVERAGE GLUCOSE BLD GHB EST-MCNC: 174.3 MG/DL
HBA1C MFR BLD: 7.7 %

## 2023-05-11 PROCEDURE — 83036 HEMOGLOBIN GLYCOSYLATED A1C: CPT

## 2023-05-11 PROCEDURE — 36415 COLL VENOUS BLD VENIPUNCTURE: CPT

## 2023-05-11 PROCEDURE — 82306 VITAMIN D 25 HYDROXY: CPT

## 2023-05-14 RX ORDER — FLUTICASONE PROPIONATE 50 MCG
SPRAY, SUSPENSION (ML) NASAL
Qty: 16 G | Refills: 0 | Status: SHIPPED | OUTPATIENT
Start: 2023-05-14

## 2023-05-22 ENCOUNTER — OFFICE VISIT (OUTPATIENT)
Dept: INTERNAL MEDICINE | Facility: CLINIC | Age: 68
End: 2023-05-22
Payer: COMMERCIAL

## 2023-05-22 ENCOUNTER — CLINICAL SUPPORT (OUTPATIENT)
Dept: INTERNAL MEDICINE | Facility: CLINIC | Age: 68
End: 2023-05-22
Attending: STUDENT IN AN ORGANIZED HEALTH CARE EDUCATION/TRAINING PROGRAM
Payer: COMMERCIAL

## 2023-05-22 VITALS
TEMPERATURE: 98 F | OXYGEN SATURATION: 99 % | DIASTOLIC BLOOD PRESSURE: 71 MMHG | RESPIRATION RATE: 12 BRPM | BODY MASS INDEX: 32.58 KG/M2 | HEART RATE: 67 BPM | WEIGHT: 215 LBS | HEIGHT: 68 IN | SYSTOLIC BLOOD PRESSURE: 129 MMHG

## 2023-05-22 DIAGNOSIS — E11.9 TYPE 2 DIABETES MELLITUS WITHOUT COMPLICATION, WITHOUT LONG-TERM CURRENT USE OF INSULIN: Primary | ICD-10-CM

## 2023-05-22 DIAGNOSIS — E11.9 TYPE 2 DIABETES MELLITUS WITHOUT COMPLICATION, WITHOUT LONG-TERM CURRENT USE OF INSULIN: ICD-10-CM

## 2023-05-22 PROCEDURE — 99213 OFFICE O/P EST LOW 20 MIN: CPT | Mod: PBBFAC

## 2023-05-22 PROCEDURE — 92228 IMG RTA DETC/MNTR DS PHY/QHP: CPT | Mod: PBBFAC,59

## 2023-05-22 NOTE — PROGRESS NOTES
Nash Marley is a 68 y.o. male here for a diabetic eye screening with non-dilated fundus photos per Daphney Marino, .    Patient cooperative?: Yes  Small pupils?: No  Last eye exam: unknown    For exam results, see Encounter Report.

## 2023-05-22 NOTE — PROGRESS NOTES
I have reviewed the notes, assessments, and management plan performed by resident, I concur with the documentation of Nash Marley.

## 2023-05-22 NOTE — PROGRESS NOTES
[facility] Internal Medicine Resident Clinic    Subjective:      66 year old  male Diabetes, HTN, CAD S/P stent, HLD. S  Denies any chest pain, shortness of breath, fever, chills, nausea, vomiting, abdominal pain  or any weakness at this time.               Review of Systems:  10 point ROS negative except for HPI    Objective:   Vital Signs:  Vitals:    05/22/23 0726   BP: 129/71   Pulse: 67   Resp: 12   Temp: 97.6 °F (36.4 °C)          Body mass index is 32.69 kg/m².     General: Alert and oriented, No acute distress.  Respiratory: Lungs are clear to auscultation, Respirations are non-labored, Breath sounds  are equal, Symmetrical chest wall expansion, No chest wall tenderness.  Cardiovascular: Normal rate, Regular rhythm, No murmur, No gallop, Good pulses equal in  all extremities, Normal peripheral perfusion, No edema.  Musculoskeletal:Normal range of motion, Normal strength, No tenderness, No swelling,  No deformity, Normal gait.  Integumentary: Warm, Pink, Intact, Moist, No pallor, No rash.  Cognition and Speech: Oriented, Speech clear and coherent, Functional cognition intact.        Laboratory:  Lab Results   Component Value Date    WBC 7.5 09/28/2022    HGB 15.0 09/28/2022    HCT 48.6 09/28/2022     09/28/2022    MCV 91.7 09/28/2022    RDW 15.0 09/28/2022    Lab Results   Component Value Date     09/28/2022    K 4.4 09/28/2022    CO2 28 09/28/2022    BUN 8.3 (L) 09/28/2022    CREATININE 0.90 09/28/2022    CALCIUM 9.1 09/28/2022      Lab Results   Component Value Date    HGBA1C 7.7 (H) 05/11/2023    .3 05/11/2023    CREATININE 0.90 09/28/2022    CREATRANDUR 149.6 09/03/2021    No results found for: TSH, FWVGEC7IFMH, Y2OEXJJ, E3WWGWP, THYROIDAB             Current Medications:  Current Outpatient Medications   Medication Instructions    aspirin (ECOTRIN) 81 mg, Oral, Daily    atorvastatin (LIPITOR) 40 mg, Oral, Daily    cholecalciferol (vitamin D3) (VITAMIN D3) 1,000 Units, Oral, Daily     fluticasone propionate (FLONASE) 50 mcg/actuation nasal spray See Instructions, USE 1 SPRAY(S) IN EACH NOSTRIL TWICE DAILY FOR 30 DAYS, # 16 gm, 0 Refill(s), Pharmacy: [facility], 174, cm, Height/Length Dosing, 09/13/21 10:53:00 CDT, 94.3, kg, Weight Dosing, 09/13/21 10:53:00 CDT    glipiZIDE (GLUCOTROL) 10 mg, Oral, 2 times daily    icosapent ethyL (VASCEPA) 1,000 mg, Oral, Daily    JANUVIA 100 mg, Oral, Daily    losartan (COZAAR) 25 mg, Oral, 2 times daily    metFORMIN (GLUCOPHAGE) 1,000 mg, Oral, 2 times daily    metoprolol tartrate (LOPRESSOR) 50 mg, Oral, 2 times daily    omega-3 fatty acids 1,000 mg, Oral, Daily        Assessment and Plan:      Left heel pain  Likely Plantar fascitis -resolved  -Left heel pain  - Xray of the left foot neg    Diabetes mellitus type II - Controlled  -POC ~7.7 on 5/11/23  -continue Januvia (sitagliptin) 100mg PO daily  -continue metformin 1000mg BID and glipizide 10mg BID   Advised BS log   - Recheck in 3 months     Hypertension  -normotensive  -continue metoprolol tartrate 50mg BID & losartan to 25mg BID (convenient dosing as  metoprolol already given BID)    L Hydrocele s/p surgical correction  -resolved  discharged from urology    No complaints at this time     CAD s/p coronary stent in 2003  -well controlled, MI was 12 years ago, denies any current chest pain  -continue ASA, statin, beta blocker; held ACE-i at this time due to cough     Dyslipidemia  -Lipid panels on 12/22 with LDL of 34 and triglycerides  of 223  Continue atorvastatin 40 daily and fish omega   - Advised healthy diet and lifestyle     Diverticulosis - no complaints at [facility] time  -last episode of diverticulitis was in March 2016  -encouraged high fiber diet, and weight loss  -last colonoscopy was 2015    L eye cataract s/p cataract surgery  following up with ophthalmology [facility] clinic.  follows ophthalmology clinic      Health Maintenance  Flu shot 9/22  DM foot exam utd next due 5/24  DM eye exam  :ordered today   -Lung Ca. Screenin pack year but quit more than 20 years ago ; will defer at this time   -Colon Ca. Screening: Colonscopy due   -AAA:  - neg    Health Maintenance   Topic Date Due    Hepatitis C Screening  Never done    Foot Exam  Never done    Eye Exam  2022    Hemoglobin A1c  2023    Lipid Panel  2023    TETANUS VACCINE  2028    Abdominal Aortic Aneurysm Screening  Completed          RTC in 3 months   Labs : A1c and urine protein/cr  Hill rom exam ordered today          Daphney Marino DO   [facility] Internal Medicine Resident Clinic

## 2023-06-25 RX ORDER — ICOSAPENT ETHYL 1000 MG/1
1 CAPSULE ORAL DAILY
Qty: 90 CAPSULE | Refills: 1 | Status: SHIPPED | OUTPATIENT
Start: 2023-06-25 | End: 2023-12-14 | Stop reason: SDUPTHER

## 2023-08-08 ENCOUNTER — LAB VISIT (OUTPATIENT)
Dept: LAB | Facility: HOSPITAL | Age: 68
End: 2023-08-08
Attending: STUDENT IN AN ORGANIZED HEALTH CARE EDUCATION/TRAINING PROGRAM
Payer: COMMERCIAL

## 2023-08-08 DIAGNOSIS — E11.9 TYPE 2 DIABETES MELLITUS WITHOUT COMPLICATION, WITHOUT LONG-TERM CURRENT USE OF INSULIN: ICD-10-CM

## 2023-08-08 LAB
EST. AVERAGE GLUCOSE BLD GHB EST-MCNC: 148.5 MG/DL
HBA1C MFR BLD: 6.8 %

## 2023-08-08 PROCEDURE — 36415 COLL VENOUS BLD VENIPUNCTURE: CPT

## 2023-08-08 PROCEDURE — 83036 HEMOGLOBIN GLYCOSYLATED A1C: CPT

## 2023-08-15 ENCOUNTER — OFFICE VISIT (OUTPATIENT)
Dept: INTERNAL MEDICINE | Facility: CLINIC | Age: 68
End: 2023-08-15
Payer: COMMERCIAL

## 2023-08-15 VITALS
SYSTOLIC BLOOD PRESSURE: 133 MMHG | TEMPERATURE: 98 F | BODY MASS INDEX: 31.67 KG/M2 | DIASTOLIC BLOOD PRESSURE: 77 MMHG | RESPIRATION RATE: 16 BRPM | HEART RATE: 65 BPM | OXYGEN SATURATION: 99 % | WEIGHT: 209 LBS | HEIGHT: 68 IN

## 2023-08-15 DIAGNOSIS — E11.9 TYPE 2 DIABETES MELLITUS WITHOUT COMPLICATION, WITHOUT LONG-TERM CURRENT USE OF INSULIN: ICD-10-CM

## 2023-08-15 PROCEDURE — 99214 OFFICE O/P EST MOD 30 MIN: CPT | Mod: PBBFAC | Performed by: STUDENT IN AN ORGANIZED HEALTH CARE EDUCATION/TRAINING PROGRAM

## 2023-08-15 RX ORDER — GLIPIZIDE 10 MG/1
10 TABLET ORAL 2 TIMES DAILY
Qty: 720 TABLET | Refills: 0 | Status: SHIPPED | OUTPATIENT
Start: 2023-08-15 | End: 2024-01-25 | Stop reason: SDUPTHER

## 2023-08-15 NOTE — PROGRESS NOTES
"[facility] Internal Medicine Resident Clinic    Subjective:      66 year old  male Diabetes, HTN, CAD S/P stent 20 years ago , HLD.  Denies any chest pain, shortness of breath, fever, chills, nausea, vomiting, abdominal pain  or any weakness at this time.               Review of Systems:  10 point ROS negative except for HPI    Objective:   Vital Signs:  Vitals:    08/15/23 0731   BP: 133/77   Pulse: 65   Resp: 16   Temp: 97.7 °F (36.5 °C)          Body mass index is 31.78 kg/m².     General: Alert and oriented, No acute distress.  Respiratory: Lungs are clear to auscultation, Respirations are non-labored, Breath sounds  are equal, Symmetrical chest wall expansion, No chest wall tenderness.  Cardiovascular: Normal rate, Regular rhythm, No murmur, No gallop, Good pulses equal in  all extremities, Normal peripheral perfusion, No edema.  Musculoskeletal:Normal range of motion, Normal strength, No tenderness, No swelling,  No deformity, Normal gait.  Integumentary: Warm, Pink, Intact, Moist, No pallor, No rash.  Cognition and Speech: Oriented, Speech clear and coherent, Functional cognition intact.        Laboratory:  Lab Results   Component Value Date    WBC 7.5 09/28/2022    HGB 15.0 09/28/2022    HCT 48.6 09/28/2022     09/28/2022    MCV 91.7 09/28/2022    RDW 15.0 09/28/2022    Lab Results   Component Value Date     09/28/2022    K 4.4 09/28/2022    CO2 28 09/28/2022    BUN 8.3 (L) 09/28/2022    CREATININE 0.90 09/28/2022    CALCIUM 9.1 09/28/2022      Lab Results   Component Value Date    HGBA1C 6.8 08/08/2023    .5 08/08/2023    CREATININE 0.90 09/28/2022    CREATRANDUR 374.3 (H) 08/08/2023    PROTEINURINE 20.9 08/08/2023    No results found for: "TSH", "[id number]", "Y7GJYBQ", "J2BIFNY", "THYROIDAB"             Current Medications:  Current Outpatient Medications   Medication Instructions    aspirin (ECOTRIN) 81 mg, Oral, Daily    atorvastatin (LIPITOR) 40 mg, Oral, Daily    " cholecalciferol (vitamin D3) (VITAMIN D3) 1,000 Units, Oral, Daily    fluticasone propionate (FLONASE) 50 mcg/actuation nasal spray See Instructions, USE 1 SPRAY(S) IN EACH NOSTRIL TWICE DAILY FOR 30 DAYS, # 16 gm, 0 Refill(s), Pharmacy: [facility], 174, cm, Height/Length Dosing, 09/13/21 10:53:00 CDT, 94.3, kg, Weight Dosing, 09/13/21 10:53:00 CDT    glipiZIDE (GLUCOTROL) 10 mg, Oral, 2 times daily    icosapent ethyL (VASCEPA) 1,000 mg, Oral, Daily    JANUVIA 100 mg, Oral, Daily    losartan (COZAAR) 25 mg, Oral, 2 times daily    metFORMIN (GLUCOPHAGE) 1,000 mg, Oral, 2 times daily    metoprolol tartrate (LOPRESSOR) 50 mg, Oral, 2 times daily    omega-3 fatty acids 1,000 mg, Oral, Daily        Assessment and Plan:      Left heel pain  Likely Plantar fascitis -resolved  -Left heel pain  - Xray of the left foot neg    Diabetes mellitus type II - Controlled  -POC 6.8  on 8/23  -continue Januvia (sitagliptin) 100mg PO daily  -continue metformin 1000mg BID and glipizide 10mg BID   Advised BS log   - Recheck in 3 months     Hypertension  -normotensive  -continue metoprolol tartrate 50mg BID & losartan to 25mg BID (convenient dosing as  metoprolol already given BID)    L Hydrocele s/p surgical correction  -resolved  discharged from urology    No complaints at this time     CAD s/p coronary stent in 2003  -well controlled, MI was 20 years ago, denies any current chest pain  -continue ASA, statin, beta blocker; held ACE-i at this time due to cough     Dyslipidemia  -Lipid panels on 12/22 with LDL of 34 and triglycerides  of 223  Continue atorvastatin 40 daily and fish omega   - Advised healthy diet and lifestyle   Lipid panel ordered today     [facility]  -last episode of diverticulitis was in March 2016  -encouraged high fiber diet, and weight loss  -last colonoscopy was 2015    L eye cataract s/p cataract surgery  following up with ophthalmology [facility] clinic.  follows ophthalmology  clinic      Health Maintenance  Pneumononia UTD  Shingles UPTD   Flu shot   DM foot exam utd next due   DM eye exam :ordered today   -Lung Ca. Screenin pack year but quit more than 20 years ago ; will defer at this time   -Colon Ca. Screening: Colonscopy due   -AAA:  - neg    Health Maintenance   Topic Date Due    Hepatitis C Screening  Never done    Foot Exam  Never done    Lipid Panel  2023    Hemoglobin A1c  2024    Eye Exam  2024    Colorectal Cancer Screening  2025    TETANUS VACCINE  2028    Shingles Vaccine  Completed    Abdominal Aortic Aneurysm Screening  Completed          RTC in 3 months   Labs : A1c and lipid panel  Optometry referral given           Daphney Marino DO   [facility] Internal Medicine Resident Clinic

## 2023-11-29 ENCOUNTER — LAB VISIT (OUTPATIENT)
Dept: LAB | Facility: HOSPITAL | Age: 68
End: 2023-11-29
Attending: STUDENT IN AN ORGANIZED HEALTH CARE EDUCATION/TRAINING PROGRAM
Payer: COMMERCIAL

## 2023-11-29 DIAGNOSIS — E11.9 TYPE 2 DIABETES MELLITUS WITHOUT COMPLICATION, WITHOUT LONG-TERM CURRENT USE OF INSULIN: ICD-10-CM

## 2023-11-29 LAB
CHOLEST SERPL-MCNC: 121 MG/DL
CHOLEST/HDLC SERPL: 4 {RATIO} (ref 0–5)
EST. AVERAGE GLUCOSE BLD GHB EST-MCNC: 174.3 MG/DL
HBA1C MFR BLD: 7.7 %
HDLC SERPL-MCNC: 32 MG/DL (ref 35–60)
LDLC SERPL CALC-MCNC: 54 MG/DL (ref 50–140)
TRIGL SERPL-MCNC: 175 MG/DL (ref 34–140)
VLDLC SERPL CALC-MCNC: 35 MG/DL

## 2023-11-29 PROCEDURE — 80061 LIPID PANEL: CPT

## 2023-11-29 PROCEDURE — 36415 COLL VENOUS BLD VENIPUNCTURE: CPT

## 2023-11-29 PROCEDURE — 83036 HEMOGLOBIN GLYCOSYLATED A1C: CPT

## 2023-12-05 ENCOUNTER — OFFICE VISIT (OUTPATIENT)
Dept: INTERNAL MEDICINE | Facility: CLINIC | Age: 68
End: 2023-12-05
Payer: COMMERCIAL

## 2023-12-05 VITALS
TEMPERATURE: 98 F | RESPIRATION RATE: 18 BRPM | DIASTOLIC BLOOD PRESSURE: 70 MMHG | HEIGHT: 68 IN | BODY MASS INDEX: 32.4 KG/M2 | SYSTOLIC BLOOD PRESSURE: 125 MMHG | WEIGHT: 213.81 LBS | OXYGEN SATURATION: 99 % | HEART RATE: 67 BPM

## 2023-12-05 DIAGNOSIS — Z00.00 WELLNESS EXAMINATION: ICD-10-CM

## 2023-12-05 DIAGNOSIS — Z23 NEED FOR INFLUENZA VACCINATION: Primary | ICD-10-CM

## 2023-12-05 PROCEDURE — 90653 IIV ADJUVANT VACCINE IM: CPT | Mod: PBBFAC

## 2023-12-05 PROCEDURE — 99213 OFFICE O/P EST LOW 20 MIN: CPT | Mod: PBBFAC,25 | Performed by: STUDENT IN AN ORGANIZED HEALTH CARE EDUCATION/TRAINING PROGRAM

## 2023-12-05 PROCEDURE — G0008 ADMIN INFLUENZA VIRUS VAC: HCPCS | Mod: PBBFAC

## 2023-12-05 NOTE — PROGRESS NOTES
"[facility] Internal Medicine Resident Clinic    Subjective:      66 year old  male Diabetes, HTN, CAD S/P stent 20 years ago , HLD.  Denies any chest pain, shortness of breath, fever, chills, nausea, vomiting, abdominal pain  or any weakness at this time.               Review of Systems:  10 point ROS negative except for HPI    Objective:   Vital Signs:  Vitals:    12/05/23 0748   BP: 125/70   Pulse: 67   Resp: 18   Temp: 97.7 °F (36.5 °C)          Body mass index is 32.51 kg/m².     General: Alert and oriented, No acute distress.  Respiratory: Lungs are clear to auscultation, Respirations are non-labored, Breath sounds  are equal, Symmetrical chest wall expansion, No chest wall tenderness.  Cardiovascular: Normal rate, Regular rhythm, No murmur, No gallop, Good pulses equal in  all extremities, Normal peripheral perfusion, No edema.  Musculoskeletal:Normal range of motion, Normal strength, No tenderness, No swelling,  No deformity, Normal gait.  Integumentary: Warm, Pink, Intact, Moist, No pallor, No rash.  Cognition and Speech: Oriented, Speech clear and coherent, Functional cognition intact.        Laboratory:  Lab Results   Component Value Date    WBC 7.5 09/28/2022    HGB 15.0 09/28/2022    HCT 48.6 09/28/2022     09/28/2022    MCV 91.7 09/28/2022    RDW 15.0 09/28/2022    Lab Results   Component Value Date     09/28/2022    K 4.4 09/28/2022    CO2 28 09/28/2022    BUN 8.3 (L) 09/28/2022    CREATININE 0.90 09/28/2022    CALCIUM 9.1 09/28/2022      Lab Results   Component Value Date    HGBA1C 7.7 (H) 11/29/2023    .3 11/29/2023    CREATININE 0.90 09/28/2022    CREATRANDUR 374.3 (H) 08/08/2023    PROTEINURINE 20.9 08/08/2023    No results found for: "TSH", "[id number]", "R0YZGIT", "B4SVIBW", "THYROIDAB"             Current Medications:  Current Outpatient Medications   Medication Instructions    aspirin (ECOTRIN) 81 mg, Oral, Daily    atorvastatin (LIPITOR) 40 mg, Oral, Daily    " cholecalciferol (vitamin D3) (VITAMIN D3) 1,000 Units, Oral, Daily    fluticasone propionate (FLONASE) 50 mcg/actuation nasal spray See Instructions, USE 1 SPRAY(S) IN EACH NOSTRIL TWICE DAILY FOR 30 DAYS, # 16 gm, 0 Refill(s), Pharmacy: [facility], 174, cm, Height/Length Dosing, 09/13/21 10:53:00 CDT, 94.3, kg, Weight Dosing, 09/13/21 10:53:00 CDT    glipiZIDE (GLUCOTROL) 10 mg, Oral, 2 times daily    icosapent ethyL (VASCEPA) 1,000 mg, Oral, Daily    JANUVIA 100 mg, Oral, Daily    losartan (COZAAR) 25 mg, Oral, 2 times daily    metFORMIN (GLUCOPHAGE) 1,000 mg, Oral, 2 times daily    metoprolol tartrate (LOPRESSOR) 50 mg, Oral, 2 times daily    omega-3 fatty acids 1,000 mg, Oral, Daily        Assessment and Plan:      Left heel pain  Likely Plantar fascitis -resolved  -Left heel pain  - Xray of the left foot neg    Diabetes mellitus type II - Controlled  -POC 6.8 >7.7  -continue Januvia (sitagliptin) 100mg PO daily  -continue metformin 1000mg BID and glipizide 10mg BID   Advised BS log   - Recheck in 3 months  - DM eye exam uptd 5/23  -Foot exam uptd 5/23    Hypertension  -normotensive 125/70  -continue metoprolol tartrate 50mg BID & losartan to 25mg BID (convenient dosing as  metoprolol already given BID)    L Hydrocele s/p surgical correction  -resolved  discharged from urology    No complaints at this time     CAD s/p coronary stent in 2003  -well controlled, MI was 20 years ago, denies any current chest pain  -continue ASA, statin, beta blocker; held ACE-i at this time due to cough     Dyslipidemia  -Lipid panels on 12/22 with LDL of 34 and triglycerides  of 223  Continue atorvastatin 40 daily  - Advised healthy diet and lifestyle   Lipid panel ordered today     Diverticulosis - no complaints at [facility] time  -last episode of diverticulitis was in March 2016  -encouraged high fiber diet, and weight loss  -last colonoscopy was 2015    L eye cataract s/p cataract surgery  following up with ophthalmology  [facility] clinic.  follows ophthalmology clinic      Health Maintenance  Pneumononia UTD  Shingles UPTD   Flu shot 23  DM foot exam utd next due   DM eye exam :uptd   -Lung Ca. Screenin pack year but quit more than 20 years ago ; will defer at this time   -Colon Ca. Screening: Colonscopy due   -AAA:  - neg    Health Maintenance   Topic Date Due    Hepatitis C Screening  Never done    Foot Exam  Never done    Eye Exam  2024    Hemoglobin A1c  2024    Lipid Panel  2024    Colorectal Cancer Screening  2025    TETANUS VACCINE  2028    Shingles Vaccine  Completed    Abdominal Aortic Aneurysm Screening  Completed          RTC in 3 months with labs   Flu vaccine today             Daphney Marino DO   [facility] Internal Medicine Resident Clinic

## 2023-12-14 RX ORDER — ICOSAPENT ETHYL 1000 MG/1
1 CAPSULE ORAL DAILY
Qty: 90 CAPSULE | Refills: 1 | Status: SHIPPED | OUTPATIENT
Start: 2023-12-14

## 2024-01-22 ENCOUNTER — OFFICE VISIT (OUTPATIENT)
Dept: OPHTHALMOLOGY | Facility: CLINIC | Age: 69
End: 2024-01-22
Payer: COMMERCIAL

## 2024-01-22 VITALS — BODY MASS INDEX: 32.28 KG/M2 | HEIGHT: 68 IN | WEIGHT: 213 LBS

## 2024-01-22 DIAGNOSIS — E11.9 DIABETES MELLITUS TYPE 2 WITHOUT RETINOPATHY: Primary | ICD-10-CM

## 2024-01-22 DIAGNOSIS — H25.813 COMBINED FORMS OF AGE-RELATED CATARACT OF BOTH EYES: ICD-10-CM

## 2024-01-22 PROCEDURE — 92250 FUNDUS PHOTOGRAPHY W/I&R: CPT | Mod: PBBFAC,PN | Performed by: STUDENT IN AN ORGANIZED HEALTH CARE EDUCATION/TRAINING PROGRAM

## 2024-01-22 PROCEDURE — 99213 OFFICE O/P EST LOW 20 MIN: CPT | Mod: PBBFAC,PN | Performed by: STUDENT IN AN ORGANIZED HEALTH CARE EDUCATION/TRAINING PROGRAM

## 2024-01-22 PROCEDURE — 92134 CPTRZ OPH DX IMG PST SGM RTA: CPT | Mod: PBBFAC,PN | Performed by: STUDENT IN AN ORGANIZED HEALTH CARE EDUCATION/TRAINING PROGRAM

## 2024-01-22 PROCEDURE — 92134 CPTRZ OPH DX IMG PST SGM RTA: CPT | Mod: PBBFAC,PN | Performed by: OPHTHALMOLOGY

## 2024-01-22 PROCEDURE — 92250 FUNDUS PHOTOGRAPHY W/I&R: CPT | Mod: PBBFAC,PN | Performed by: OPHTHALMOLOGY

## 2024-01-22 NOTE — PROGRESS NOTES
HPI    Patient's A1C on 11/29/2023 was 7.7  Last edited by Elizabeth Cohen MA on 1/22/2024  2:09 PM.            Assessment /Plan     For exam results, see Encounter Report.    Diabetes mellitus type 2 without retinopathy    Combined forms of age-related cataract of both eyes      OCT Macula 1/22/24  OD: 270, normal foveal contour, no IRF or SRF  OS: 278, normal foveal contour, no IRF or SRF    OCT RNFL 1/22/24  OD: 82, all green  OS: 88, all green    1) DM II w/o Retinopathy, OU  - Last A1c 7.7%    2) Cataracts, OD  - BCVA 20/30  - MRX provided    3) PSK, OS  - BCVA 20/20    RTC 6 months cat eval if interested

## 2024-01-25 DIAGNOSIS — E78.5 HYPERLIPIDEMIA, UNSPECIFIED HYPERLIPIDEMIA TYPE: ICD-10-CM

## 2024-01-25 DIAGNOSIS — E11.9 TYPE 2 DIABETES MELLITUS WITHOUT COMPLICATION, WITHOUT LONG-TERM CURRENT USE OF INSULIN: ICD-10-CM

## 2024-01-29 ENCOUNTER — TELEPHONE (OUTPATIENT)
Dept: INTERNAL MEDICINE | Facility: CLINIC | Age: 69
End: 2024-01-29
Payer: COMMERCIAL

## 2024-01-29 RX ORDER — GLIPIZIDE 10 MG/1
10 TABLET ORAL 2 TIMES DAILY
Qty: 720 TABLET | Refills: 0 | Status: SHIPPED | OUTPATIENT
Start: 2024-01-29 | End: 2025-01-28

## 2024-01-29 RX ORDER — METFORMIN HYDROCHLORIDE 1000 MG/1
1000 TABLET ORAL 2 TIMES DAILY
Qty: 180 TABLET | Refills: 3 | Status: SHIPPED | OUTPATIENT
Start: 2024-01-29

## 2024-01-29 RX ORDER — ATORVASTATIN CALCIUM 40 MG/1
40 TABLET, FILM COATED ORAL DAILY
Qty: 90 TABLET | Refills: 3 | Status: SHIPPED | OUTPATIENT
Start: 2024-01-29

## 2024-01-29 RX ORDER — METOPROLOL TARTRATE 50 MG/1
50 TABLET ORAL 2 TIMES DAILY
Qty: 180 TABLET | Refills: 3 | Status: SHIPPED | OUTPATIENT
Start: 2024-01-29

## 2024-01-29 RX ORDER — SITAGLIPTIN 100 MG/1
100 TABLET, FILM COATED ORAL DAILY
Qty: 90 TABLET | Refills: 3 | Status: SHIPPED | OUTPATIENT
Start: 2024-01-29

## 2024-01-29 NOTE — TELEPHONE ENCOUNTER
Dr Marino,     Can you please resend the prescriptions that have been proposed. Pts pharmacy ([facility]) states that they have not received prescriptions.    Metformin  Januvia  Glipizide    Please advise.

## 2024-02-13 ENCOUNTER — OFFICE VISIT (OUTPATIENT)
Dept: URGENT CARE | Facility: CLINIC | Age: 69
End: 2024-02-13
Payer: COMMERCIAL

## 2024-02-13 VITALS
HEIGHT: 68 IN | DIASTOLIC BLOOD PRESSURE: 82 MMHG | RESPIRATION RATE: 18 BRPM | TEMPERATURE: 98 F | OXYGEN SATURATION: 99 % | SYSTOLIC BLOOD PRESSURE: 149 MMHG | WEIGHT: 213 LBS | HEART RATE: 67 BPM | BODY MASS INDEX: 32.28 KG/M2

## 2024-02-13 DIAGNOSIS — H61.23 BILATERAL IMPACTED CERUMEN: Primary | ICD-10-CM

## 2024-02-13 PROCEDURE — 99213 OFFICE O/P EST LOW 20 MIN: CPT | Mod: S$PBB,,, | Performed by: NURSE PRACTITIONER

## 2024-02-13 PROCEDURE — 99215 OFFICE O/P EST HI 40 MIN: CPT | Mod: PBBFAC | Performed by: NURSE PRACTITIONER

## 2024-02-13 RX ORDER — MECLIZINE HCL 12.5 MG 12.5 MG/1
12.5 TABLET ORAL 2 TIMES DAILY PRN
Qty: 10 TABLET | Refills: 0 | Status: SHIPPED | OUTPATIENT
Start: 2024-02-13 | End: 2024-04-22

## 2024-02-13 NOTE — PROGRESS NOTES
"Subjective:      Patient ID: Nash Marley is a 69 y.o. male.    Vitals:  height is 5' 8" (1.727 m) and weight is 96.6 kg (213 lb). His oral temperature is 97.6 °F (36.4 °C). His blood pressure is 149/82 (abnormal) and his pulse is 67. His respiration is 18 and oxygen saturation is 99%.     Chief Complaint: Dizziness (Patient reports feeling dizzy and muffled ears x today )    HPI Presents with c/o muffled hearing in mild dizziness when getting out of bed this morning.  Patient denies any chest pain or shortness a breath headache or dizziness, fever cough weaknesses or fatigue.  Reviewed records    Neurological:  Positive for dizziness.      Objective:     Physical Exam   Constitutional: He is oriented to person, place, and time. He appears well-developed.  Non-toxic appearance. He does not appear ill. No distress.   HENT:   Head: Normocephalic and atraumatic.   Ears:   Right Ear: There is cerumen present. No tenderness. Tympanic membrane is not injected, not perforated, not erythematous, not retracted and not bulging. No middle ear effusion. impacted cerumen  Left Ear: There is cerumen present. No tenderness. Tympanic membrane is not injected, not perforated, not erythematous, not retracted and not bulging.  No middle ear effusion. impacted cerumen  Nose: No purulent discharge. Right sinus exhibits no maxillary sinus tenderness and no frontal sinus tenderness. Left sinus exhibits no maxillary sinus tenderness and no frontal sinus tenderness.   Mouth/Throat: Uvula is midline. Mucous membranes are moist.   Bilateral cerumen impaction with hard ear wax after ear irrigation both ears were occluded where it canal could be visualize tympanic membrane is pearly gray without any injection or bulging no ear canal irritation redness or erythema.  Patient tolerated well and states that hearing and dizziness or feeling off quickly resolved after procedure.       Comments: Bilateral cerumen impaction with hard ear wax after ear " irrigation both ears were occluded where it canal could be visualize tympanic membrane is pearly gray without any injection or bulging no ear canal irritation redness or erythema.  Patient tolerated well and states that hearing and dizziness or feeling off quickly resolved after procedure.   Eyes: Conjunctivae are normal. Right eye exhibits no discharge. Left eye exhibits no discharge. Extraocular movement intact   Neck: Neck supple. No neck rigidity present.   Cardiovascular: Regular rhythm.   Pulmonary/Chest: Effort normal and breath sounds normal. No respiratory distress. He has no wheezes. He has no rales.   Abdominal: Normal appearance.   Lymphadenopathy:     He has no cervical adenopathy.   Neurological: no focal deficit. He is alert and oriented to person, place, and time. He displays no weakness and normal reflexes. No cranial nerve deficit or sensory deficit. Coordination and gait normal.   Skin: Skin is warm, dry and not diaphoretic. Capillary refill takes less than 2 seconds.   Psychiatric: His behavior is normal. Mood, judgment and thought content normal.   Nursing note and vitals reviewed.      Assessment:     1. Bilateral impacted cerumen        Plan:   ER precautions given and discussed  Also educated on the signs of a stroke weakness or any worsening dizziness to go to the ER    F/u with pcp in 2-3 days  Debrox Rx was sent to your pharmacy, however this is an over-the-counter medication and can be purchased if not covered by your insurance. Use this for maintenance of wax. As directed on packaging for 4 days in a row, once per month.  Stop using Qtips to remove wax from your ears.   If anitbiotic ear drops were prescribed for you, start those today.     Bilateral impacted cerumen    Other orders  -     carbamide peroxide (DEBROX) 6.5 % otic solution; Place 5 drops into both ears 2 (two) times daily.  Dispense: 15 mL; Refill: 0  -     meclizine (ANTIVERT) 12.5 mg tablet; Take 1 tablet (12.5 mg total)  by mouth 2 (two) times daily as needed for Dizziness.  Dispense: 10 tablet; Refill: 0

## 2024-02-13 NOTE — PATIENT INSTRUCTIONS
See patient education, and f/u  in 2-3 days with your dr if dizziness continues.    Debrox Rx was sent to your pharmacy, however this is an over-the-counter medication and can be purchased if not covered by your insurance. Use this for maintenance of wax. As directed on packaging for 4 days in a row, once per month.  Stop using Qtips to remove wax from your ears.   If anitbiotic ear drops were prescribed for you, start those today.       Go to the ER if you experience chest pain with shortness of breath, shortness of breath when moving around your house, high fevers 103.0+, excessive vomiting/diarrhea, or general distress.

## 2024-03-18 DIAGNOSIS — I10 HYPERTENSION, UNSPECIFIED TYPE: ICD-10-CM

## 2024-03-19 RX ORDER — LOSARTAN POTASSIUM 25 MG/1
25 TABLET ORAL 2 TIMES DAILY
Qty: 180 TABLET | Refills: 3 | Status: SHIPPED | OUTPATIENT
Start: 2024-03-19

## 2024-04-15 ENCOUNTER — LAB VISIT (OUTPATIENT)
Dept: LAB | Facility: HOSPITAL | Age: 69
End: 2024-04-15
Attending: STUDENT IN AN ORGANIZED HEALTH CARE EDUCATION/TRAINING PROGRAM
Payer: COMMERCIAL

## 2024-04-15 DIAGNOSIS — Z00.00 WELLNESS EXAMINATION: ICD-10-CM

## 2024-04-15 LAB
ALBUMIN SERPL-MCNC: 3.5 G/DL (ref 3.4–4.8)
ALBUMIN/GLOB SERPL: 1.2 RATIO (ref 1.1–2)
ALP SERPL-CCNC: 111 UNIT/L (ref 40–150)
ALT SERPL-CCNC: 17 UNIT/L (ref 0–55)
AST SERPL-CCNC: 13 UNIT/L (ref 5–34)
BASOPHILS # BLD AUTO: 0.06 X10(3)/MCL
BASOPHILS NFR BLD AUTO: 0.8 %
BILIRUB SERPL-MCNC: 0.3 MG/DL
BUN SERPL-MCNC: 10.4 MG/DL (ref 8.4–25.7)
CALCIUM SERPL-MCNC: 8.6 MG/DL (ref 8.8–10)
CHLORIDE SERPL-SCNC: 107 MMOL/L (ref 98–107)
CO2 SERPL-SCNC: 23 MMOL/L (ref 23–31)
CREAT SERPL-MCNC: 1.19 MG/DL (ref 0.73–1.18)
CREAT UR-MCNC: 356.9 MG/DL (ref 63–166)
EOSINOPHIL # BLD AUTO: 0.29 X10(3)/MCL (ref 0–0.9)
EOSINOPHIL NFR BLD AUTO: 4 %
ERYTHROCYTE [DISTWIDTH] IN BLOOD BY AUTOMATED COUNT: 15 % (ref 11.5–17)
GFR SERPLBLD CREATININE-BSD FMLA CKD-EPI: >60 MLS/MIN/1.73/M2
GLOBULIN SER-MCNC: 3 GM/DL (ref 2.4–3.5)
GLUCOSE SERPL-MCNC: 146 MG/DL (ref 82–115)
HCT VFR BLD AUTO: 43.5 % (ref 42–52)
HCV AB SERPL QL IA: NONREACTIVE
HGB BLD-MCNC: 13.9 G/DL (ref 14–18)
IMM GRANULOCYTES # BLD AUTO: 0.02 X10(3)/MCL (ref 0–0.04)
IMM GRANULOCYTES NFR BLD AUTO: 0.3 %
LYMPHOCYTES # BLD AUTO: 1.68 X10(3)/MCL (ref 0.6–4.6)
LYMPHOCYTES NFR BLD AUTO: 23 %
MCH RBC QN AUTO: 28.2 PG (ref 27–31)
MCHC RBC AUTO-ENTMCNC: 32 G/DL (ref 33–36)
MCV RBC AUTO: 88.2 FL (ref 80–94)
MONOCYTES # BLD AUTO: 0.75 X10(3)/MCL (ref 0.1–1.3)
MONOCYTES NFR BLD AUTO: 10.2 %
NEUTROPHILS # BLD AUTO: 4.52 X10(3)/MCL (ref 2.1–9.2)
NEUTROPHILS NFR BLD AUTO: 61.7 %
NRBC BLD AUTO-RTO: 0 %
PLATELET # BLD AUTO: 295 X10(3)/MCL (ref 130–400)
PMV BLD AUTO: 9.8 FL (ref 7.4–10.4)
POTASSIUM SERPL-SCNC: 4.1 MMOL/L (ref 3.5–5.1)
PROT SERPL-MCNC: 6.5 GM/DL (ref 5.8–7.6)
PROT UR STRIP-MCNC: 18.7 MG/DL
RBC # BLD AUTO: 4.93 X10(6)/MCL (ref 4.7–6.1)
SODIUM SERPL-SCNC: 137 MMOL/L (ref 136–145)
URINE PROTEIN/CREATININE RATIO (OLG): 0.1
WBC # SPEC AUTO: 7.32 X10(3)/MCL (ref 4.5–11.5)

## 2024-04-15 PROCEDURE — 85025 COMPLETE CBC W/AUTO DIFF WBC: CPT

## 2024-04-15 PROCEDURE — 80053 COMPREHEN METABOLIC PANEL: CPT

## 2024-04-15 PROCEDURE — 82570 ASSAY OF URINE CREATININE: CPT

## 2024-04-15 PROCEDURE — 86803 HEPATITIS C AB TEST: CPT

## 2024-04-15 PROCEDURE — 36415 COLL VENOUS BLD VENIPUNCTURE: CPT

## 2024-04-22 ENCOUNTER — OFFICE VISIT (OUTPATIENT)
Dept: INTERNAL MEDICINE | Facility: CLINIC | Age: 69
End: 2024-04-22
Payer: COMMERCIAL

## 2024-04-22 VITALS
OXYGEN SATURATION: 99 % | HEART RATE: 67 BPM | BODY MASS INDEX: 32.4 KG/M2 | DIASTOLIC BLOOD PRESSURE: 71 MMHG | RESPIRATION RATE: 18 BRPM | SYSTOLIC BLOOD PRESSURE: 127 MMHG | HEIGHT: 68 IN | TEMPERATURE: 98 F | WEIGHT: 213.81 LBS

## 2024-04-22 DIAGNOSIS — Z87.891 HISTORY OF TOBACCO ABUSE: ICD-10-CM

## 2024-04-22 DIAGNOSIS — E11.9 TYPE 2 DIABETES MELLITUS WITHOUT COMPLICATION, WITHOUT LONG-TERM CURRENT USE OF INSULIN: Primary | ICD-10-CM

## 2024-04-22 DIAGNOSIS — F17.200 SMOKER: ICD-10-CM

## 2024-04-22 LAB — HBA1C MFR BLD: 7.4 %

## 2024-04-22 PROCEDURE — 83036 HEMOGLOBIN GLYCOSYLATED A1C: CPT | Mod: PBBFAC | Performed by: STUDENT IN AN ORGANIZED HEALTH CARE EDUCATION/TRAINING PROGRAM

## 2024-04-22 PROCEDURE — 99214 OFFICE O/P EST MOD 30 MIN: CPT | Mod: PBBFAC | Performed by: STUDENT IN AN ORGANIZED HEALTH CARE EDUCATION/TRAINING PROGRAM

## 2024-04-22 NOTE — PROGRESS NOTES
"[facility] Medicine [facility]ident [facility]    Subjective:      66 year old  male Diabetes, HTN, CAD S/P stent 20 years ago , HLD.  Denies any chest pain, shortness of breath, fever, chills, nausea, vomiting, abdominal pain  or any weakness at this time.             Review of Systems:  10 point ROS negative except for HPI    Objective:   Vital Signs:  Vitals:    04/22/24 0746   BP: 127/71   Pulse: 67   Resp: 18   Temp: 98.1 °F (36.7 °C)          Body mass index is 32.51 kg/m².     General: Alert and oriented, No acute distress.  Respiratory: Lungs are clear to auscultation, Respirations are non-labored, Breath sounds  are equal, Symmetrical chest wall expansion, No chest wall tenderness.  Cardiovascular: Normal rate, Regular rhythm, No murmur, No gallop, Good pulses equal in  all extremities, Normal peripheral perfusion, No edema.  Musculoskeletal:Normal range of motion, Normal strength, No tenderness, No swelling,  No deformity, Normal gait.  Integumentary: Warm, Pink, Intact, Moist, No pallor, No rash.  Cognition and Speech: Oriented, Speech clear and coherent, Functional cognition intact.        Laboratory:  Lab Results   Component Value Date    WBC 7.32 04/15/2024    HGB 13.9 (L) 04/15/2024    HCT 43.5 04/15/2024     04/15/2024    MCV 88.2 04/15/2024    RDW 15.0 04/15/2024    Lab Results   Component Value Date     04/15/2024    K 4.1 04/15/2024    CO2 23 04/15/2024    BUN 10.4 04/15/2024    CREATININE 1.19 (H) 04/15/2024    CALCIUM 8.6 (L) 04/15/2024      Lab Results   Component Value Date    HGBA1C 7.7 (H) 11/29/2023    .3 11/29/2023    CREATININE 1.19 (H) 04/15/2024    CREATRANDUR 356.9 (H) 04/15/2024    PROTEINURINE 18.7 04/15/2024    No results found for: "TSH", "WSUAWI3AWIL", "Y6CFUVS", "N0QIDEJ", "THYROIDAB"             Current Medications:  Current Outpatient Medications   Medication Instructions    aspirin (ECOTRIN) 81 mg, Oral, Daily    atorvastatin (LIPITOR) 40 mg, Oral, Daily "    carbamide peroxide (DEBROX) 6.5 % otic solution 5 drops, Both Ears, 2 times daily    fluticasone propionate (FLONASE) 50 mcg/actuation nasal spray See Instructions, USE 1 SPRAY(S) IN EACH NOSTRIL TWICE DAILY FOR 30 DAYS, # 16 gm, 0 Refill(s), Pharmacy: [facility], 174, cm, Height/Length Dosing, 09/13/21 10:53:00 CDT, 94.3, kg, Weight Dosing, 09/13/21 10:53:00 CDT    glipiZIDE (GLUCOTROL) 10 mg, Oral, 2 times daily    icosapent ethyL (VASCEPA) 1,000 mg, Oral, Daily    JANUVIA 100 mg, Oral, Daily    losartan (COZAAR) 25 mg, Oral, 2 times daily    metFORMIN (GLUCOPHAGE) 1,000 mg, Oral, 2 times daily    metoprolol tartrate (LOPRESSOR) 50 mg, Oral, 2 times daily        Assessment and Plan:      Left heel pain  Likely Plantar fascitis -resolved  -Left heel pain  - Xray of the left foot neg    Diabetes mellitus type II - Controlled  -POC 6.8 >7.7>7.3  -continue Januvia (sitagliptin) 100mg PO daily  -continue metformin 1000mg BID and glipizide 10mg BID   - pt stated he has been not eating correctly and not following diabetic diet; Will   Advised BS log   - Recheck in 3 months  - DM eye exam uptd 5/23  -Foot exam uptd 5/23    Hypertension  -normotensive 125/70  -continue metoprolol tartrate 50mg BID & losartan to 25mg BID (convenient dosing as  metoprolol already given BID)    L Hydrocele s/p surgical correction  -resolved  discharged from urology    No complaints at this time     CAD s/p coronary stent in 2003  -well controlled, MI was 20 years ago, denies any current chest pain  -continue ASA, statin, beta blocker; held ACE-i at this time due to cough     Dyslipidemia  -Lipid panels on 12/22 with LDL of 34 and triglycerides  of 223  Continue atorvastatin 40 daily  - Advised healthy diet and lifestyle     Diverticulosis - no complaints at [facility] time  -last episode of diverticulitis was in March 2016  -encouraged high fiber diet, and weight loss  -last colonoscopy was 2015    L eye cataract s/p cataract  surgery  following up with ophthalmology [facility] clinic.  follows ophthalmology clinic      Health Maintenance  Pneumononia UTD  Shingles UPTD   Flu shot 23  DM foot exam utd next due   DM eye exam :uptd   -Lung Ca. Screenin pack year but quit more than 20 years ago ; will defer at this time   -Colon Ca. Screening: Colonscopy due   -AAA:  - neg  - low dose CT ordered     Health Maintenance   Topic Date Due    Hemoglobin A1c  2024    Lipid Panel  2024    Eye Exam  2025    High Dose Statin  2025    Colorectal Cancer Screening  2025    Foot Exam  2025    TETANUS VACCINE  2028    Hepatitis C Screening  Completed    Shingles Vaccine  Completed    Abdominal Aortic Aneurysm Screening  Completed                      Daphney Marino DO   [facility]ident [facility]

## 2024-06-11 RX ORDER — ICOSAPENT ETHYL 1 G/1
1 CAPSULE ORAL DAILY
Qty: 90 CAPSULE | Refills: 1 | Status: SHIPPED | OUTPATIENT
Start: 2024-06-11

## 2024-07-17 ENCOUNTER — OFFICE VISIT (OUTPATIENT)
Dept: INTERNAL MEDICINE | Facility: CLINIC | Age: 69
End: 2024-07-17
Payer: COMMERCIAL

## 2024-07-17 ENCOUNTER — TELEPHONE (OUTPATIENT)
Dept: INTERNAL MEDICINE | Facility: CLINIC | Age: 69
End: 2024-07-17

## 2024-07-17 VITALS
OXYGEN SATURATION: 98 % | HEIGHT: 68 IN | RESPIRATION RATE: 18 BRPM | DIASTOLIC BLOOD PRESSURE: 69 MMHG | TEMPERATURE: 98 F | WEIGHT: 209.63 LBS | BODY MASS INDEX: 31.77 KG/M2 | SYSTOLIC BLOOD PRESSURE: 121 MMHG | HEART RATE: 61 BPM

## 2024-07-17 DIAGNOSIS — E11.9 TYPE 2 DIABETES MELLITUS WITHOUT COMPLICATION, WITHOUT LONG-TERM CURRENT USE OF INSULIN: Primary | ICD-10-CM

## 2024-07-17 PROCEDURE — 99214 OFFICE O/P EST MOD 30 MIN: CPT | Mod: PBBFAC | Performed by: STUDENT IN AN ORGANIZED HEALTH CARE EDUCATION/TRAINING PROGRAM

## 2024-07-17 RX ORDER — FLUTICASONE PROPIONATE 50 MCG
SPRAY, SUSPENSION (ML) NASAL
Qty: 9.9 ML | Refills: 0 | Status: SHIPPED | OUTPATIENT
Start: 2024-07-17

## 2024-07-17 NOTE — PROGRESS NOTES
"  [facility] Internal Medicine Resident [facility]    Subjective:      66 year old  male Diabetes, HTN, CAD S/P stent 20 years ago , HLD.  Denies any chest pain, shortness of breath, fever, chills, nausea, vomiting, abdominal pain  or any weakness at this time. Refilled meds at this time              Review of Systems:  10 point ROS negative except for HPI    Objective:   Vital Signs:  Vitals:    07/17/24 0743   BP: 121/69   Pulse: 61   Resp: 18   Temp: 98.1 °F (36.7 °C)          Body mass index is 31.87 kg/m².     General: Alert and oriented, No acute distress.  Respiratory: Lungs are clear to auscultation, Respirations are non-labored, Breath sounds  are equal, Symmetrical chest wall expansion, No chest wall tenderness.  Cardiovascular: Normal rate, Regular rhythm, No murmur, No gallop, Good pulses equal in  all extremities, Normal peripheral perfusion, No edema.  Musculoskeletal:Normal range of motion, Normal strength, No tenderness, No swelling,  No deformity, Normal gait.  Integumentary: Warm, Pink, Intact, Moist, No pallor, No rash.  Cognition and Speech: Oriented, Speech clear and coherent, Functional cognition intact.        Laboratory:  Lab Results   Component Value Date    WBC 7.32 04/15/2024    HGB 13.9 (L) 04/15/2024    HCT 43.5 04/15/2024     04/15/2024    MCV 88.2 04/15/2024    RDW 15.0 04/15/2024    Lab Results   Component Value Date     04/15/2024    K 4.1 04/15/2024     04/15/2024    CO2 23 04/15/2024    BUN 10.4 04/15/2024    CREATININE 1.19 (H) 04/15/2024    CALCIUM 8.6 (L) 04/15/2024      Lab Results   Component Value Date    HGBA1C 7.7 (H) 11/29/2023    .3 11/29/2023    CREATININE 1.19 (H) 04/15/2024    CREATRANDUR 356.9 (H) 04/15/2024    PROTEINURINE 18.7 04/15/2024    No results found for: "TSH", "GRLZUS5JVVL", "J8AOJZG", "T3DUMAN", "THYROIDAB"             Current Medications:  Current Outpatient Medications   Medication Instructions    aspirin (ECOTRIN) 81 mg, Oral, " Daily    atorvastatin (LIPITOR) 40 mg, Oral, Daily    carbamide peroxide (DEBROX) 6.5 % otic solution 5 drops, Both Ears, 2 times daily    fluticasone propionate (FLONASE) 50 mcg/actuation nasal spray See Instructions, USE 1 SPRAY(S) IN EACH NOSTRIL TWICE DAILY FOR 30 DAYS, # 16 gm, 0 Refill(s), Pharmacy: [facility], 174, cm, Height/Length Dosing, 09/13/21 10:53:00 CDT, 94.3, kg, Weight Dosing, 09/13/21 10:53:00 CDT    glipiZIDE (GLUCOTROL) 10 mg, Oral, 2 times daily    icosapent ethyL (VASCEPA) 1,000 mg, Oral, Daily    JANUVIA 100 mg, Oral, Daily    losartan (COZAAR) 25 mg, Oral, 2 times daily    metFORMIN (GLUCOPHAGE) 1,000 mg, Oral, 2 times daily    metoprolol tartrate (LOPRESSOR) 50 mg, Oral, 2 times daily        Assessment and Plan:      Left heel pain  Likely Plantar fascitis -resolved  -Left heel pain  - Xray of the left foot neg    Diabetes mellitus type II - Controlled  -POC 6.8 >7.7>7.3  -continue Januvia (sitagliptin) 100mg PO daily  -continue metformin 1000mg BID and glipizide 10mg BID   - pt stated he has been not eating correctly and not following diabetic diet; Will   Advised BS log   - Recheck in 3 months  - DM eye exam uptd 5/23, Follows with optho outpt  -Foot exam uptd 5/24  -A1c at next visit     Hypertension  -normotensive 121/69  -continue metoprolol tartrate 50mg BID & losartan to 25mg BID (convenient dosing as  metoprolol already given BID)    L Hydrocele s/p surgical correction  -resolved  discharged from urology    No complaints at this time     CAD s/p coronary stent in 2003  -well controlled, MI was 20 years ago, denies any current chest pain  -continue ASA, statin, beta blocker; held ACE-i at this time due to cough     Dyslipidemia  -Lipid panels on 12/22 with LDL of 34 and triglycerides  of 223  Continue atorvastatin 40 daily  - Advised healthy diet and lifestyle   - lipid panel ordered     Diverticulosis - [facility] complaints [facility] time  -last episode of diverticulitis was in  2016  -encouraged high fiber diet, and weight loss  -last colonoscopy was     L eye cataract s/p cataract surgery  following up with ophthalmology [facility] clinic.  follows ophthalmology clinic      Health Maintenance  Pneumononia UTD  Shingles UPTD   Flu shot 23  DM foot exam utd   DM eye exam :uptd   -Lung Ca. Screenin pack year but quit more than 20 years ago ; will defer at this time   -Colon Ca. Screening: Colonscopy due   -AAA:  - neg  - low dose CT ordered     Health Maintenance   Topic Date Due    Hemoglobin A1c  10/22/2024    Lipid Panel  2024    Eye Exam  2025    Colorectal Cancer Screening  2025    High Dose Statin  2025    Foot Exam  2025    TETANUS VACCINE  2028    Hepatitis C Screening  Completed    Shingles Vaccine  Completed    Abdominal Aortic Aneurysm Screening  Completed                      Daphney Marino DO   [facility] Internal Medicine Resident [facility]

## 2024-07-22 ENCOUNTER — OFFICE VISIT (OUTPATIENT)
Dept: OPHTHALMOLOGY | Facility: CLINIC | Age: 69
End: 2024-07-22
Payer: COMMERCIAL

## 2024-07-22 VITALS — WEIGHT: 209.44 LBS | BODY MASS INDEX: 31.74 KG/M2 | HEIGHT: 68 IN

## 2024-07-22 DIAGNOSIS — H25.811 COMBINED FORM OF AGE-RELATED CATARACT, RIGHT EYE: Primary | ICD-10-CM

## 2024-07-22 DIAGNOSIS — E11.9 DIABETES MELLITUS WITHOUT OPHTHALMIC MANIFESTATIONS: ICD-10-CM

## 2024-07-22 PROCEDURE — 99213 OFFICE O/P EST LOW 20 MIN: CPT | Mod: PBBFAC,PN,25

## 2024-07-22 PROCEDURE — 92136 OPHTHALMIC BIOMETRY: CPT | Mod: PBBFAC,PN | Performed by: OPHTHALMOLOGY

## 2024-07-22 RX ORDER — TETRACAINE HYDROCHLORIDE 5 MG/ML
1 SOLUTION OPHTHALMIC
OUTPATIENT
Start: 2024-07-22

## 2024-07-22 RX ORDER — PHENYLEPHRINE HYDROCHLORIDE 25 MG/ML
1 SOLUTION/ DROPS OPHTHALMIC
OUTPATIENT
Start: 2024-07-22

## 2024-07-22 RX ORDER — TROPICAMIDE 10 MG/ML
1 SOLUTION/ DROPS OPHTHALMIC
OUTPATIENT
Start: 2024-07-22

## 2024-07-22 RX ORDER — SODIUM CHLORIDE 0.9 % (FLUSH) 0.9 %
2 SYRINGE (ML) INJECTION ONCE
OUTPATIENT
Start: 2024-07-22

## 2024-07-22 RX ORDER — CYCLOPENTOLATE HYDROCHLORIDE 10 MG/ML
1 SOLUTION/ DROPS OPHTHALMIC
OUTPATIENT
Start: 2024-07-22

## 2024-07-22 NOTE — PROGRESS NOTES
HPI    RTC 6 months cat eval OD if interested    States received new Mrx last vision, VA improved with new glasses.  Last edited by Nancy Horton RN on 7/22/2024  9:27 AM.          Assessment /Plan     For exam results, see Encounter Report.    There are no diagnoses linked to this encounter.    OCT Macula 1/22/24  OD: 270, normal foveal contour, no IRF or SRF  OS: 278, normal foveal contour, no IRF or SRF    OCT RNFL 1/22/24  OD: 82, all green  OS: 88, all green      ASSESSMENT / PLAN    Visually significant age-related cataract, OD  - NSC/CC 2+  - Patient with visually significant cataract and desires surgical removal. Patient has been having difficulties performing tasks such as reading due to glare and driving at night  - Thoroughly discussed cataract surgery today, risks/benefits/alternatives discussed including risk of infection, break of lens capsule/other complications, injury to ocular structures including retinal detachment, and possibility of additional surgery required. Informed consent obtained, signed, and placed in the chart.  - MRX done today; BCVA: 20/40   - IOP today: 13  - Pt interested in cataract surgery and qualifies for surgery  - Trauma: None  - Guttae: None  - Phaco/iridodonesis: None  - Trypan blue: No  - Flomax use: None  - Dilation: 7mm  - Anticoagulant/antiplatelet use: ASA  - On ozempic/weight-loss medications: Denies  - Preop workup: Per pre-op clinic/anesthesia  - Cooperative with exam: Pt able to lie flat for 30 minutes, will plan to do under local  - Comorbidities: HTN, DM, CAD w/ Stent in 2003  - Medical clearance: PCP  - Lens to be used: IOP Calc performed  - Date of surgery: 7/30/24 with Dr. Samuels/Sriram for Right eye  - RTC POD1, POW1, POM1    2) DM II w/o Retinopathy, OU  - Last A1c as below:  (7.4)  04/22/2024  - No signs of retinopathy on exam  - No signs of DME on OCT mac  - Encouraged good BS/BP/Cholesterol control, f/u with PCP regularly  - Monitor with annual DFE (next due  7/22/2025)    3) PSK, OS  - BCVA 20/20    RTC POD1, POW1, POM1

## 2024-07-23 ENCOUNTER — ANESTHESIA EVENT (OUTPATIENT)
Dept: SURGERY | Facility: HOSPITAL | Age: 69
End: 2024-07-23
Payer: COMMERCIAL

## 2024-07-23 NOTE — ANESTHESIA PREPROCEDURE EVALUATION
Nash Marley is a 69 y.o. male PRESENTING FOR PHACOEMULSIFICATION, CATARACT (Right: Eye) with a history of   -Combined form of age-related cataract, right eye     -H/O MI/CAD W/ STENT PLACEMENT 2003--patient followed cardiology for about 5 years after MI and reports being discharged. Denies any SOB or CP since that time  -HLD  -HTN  -T2DM (A1C 7.4% 4/22/24); AM CBG  -OBESITY, BMI 31  -FORMER SMOKER    BETA-BLOCKER: METOPROLOL   Last dose: 7/30/24  @ 0500    New Orders for Anesthesia: DM PROTOCOL, EKG    Patient Active Problem List   Diagnosis    Diabetes mellitus    Hypertension    HLD (hyperlipidemia)    Myocardial infarction    CAD (coronary artery disease)       Pre-op Assessment    I have reviewed the NPO Status.      Review of Systems  Anesthesia Hx:  No problems with previous Anesthesia                Social:  Former Smoker       Cardiovascular:     Hypertension, poorly controlled  Past MI CAD  asymptomatic CABG/stent        hyperlipidemia                             Pulmonary:  Pulmonary Normal                       Renal/:  Renal/ Normal                 Hepatic/GI:  Hepatic/GI Normal                 Neurological:  Neurology Normal                                      Endocrine:  Diabetes, poorly controlled, type 2         Obesity / BMI > 30    Vitals:    07/30/24 0914 07/30/24 0956 07/30/24 1015   BP: (!) 157/75  (!) 157/75   Pulse: 67     Resp: 18     Temp: 36.3 °C (97.3 °F)     TempSrc: Oral     SpO2: 98%     Weight:  93.9 kg (207 lb)          Physical Exam  General: Alert, Cooperative and Well nourished    Airway:  Mallampati: II   Mouth Opening: Normal  TM Distance: Normal  Tongue: Normal  Neck ROM: Normal ROM    Dental:  Intact    Chest/Lungs:  Clear to auscultation, Normal Respiratory Rate    Heart:  Rate: Normal  Rhythm: Regular Rhythm  Sounds: Normal       Latest Reference Range & Units 07/30/24 11:10   POCT Glucose 70 - 110 mg/dL 126 (H)   (H): Data is abnormally high    Lab Results   Component  Value Date    WBC 7.32 04/15/2024    HGB 13.9 (L) 04/15/2024    HCT 43.5 04/15/2024    MCV 88.2 04/15/2024     04/15/2024     CMP  Sodium   Date Value Ref Range Status   04/15/2024 137 136 - 145 mmol/L Final     Potassium   Date Value Ref Range Status   04/15/2024 4.1 3.5 - 5.1 mmol/L Final     Chloride   Date Value Ref Range Status   04/15/2024 107 98 - 107 mmol/L Final     CO2   Date Value Ref Range Status   04/15/2024 23 23 - 31 mmol/L Final     Blood Urea Nitrogen   Date Value Ref Range Status   04/15/2024 10.4 8.4 - 25.7 mg/dL Final     Creatinine   Date Value Ref Range Status   04/15/2024 1.19 (H) 0.73 - 1.18 mg/dL Final     Calcium   Date Value Ref Range Status   04/15/2024 8.6 (L) 8.8 - 10.0 mg/dL Final     Albumin   Date Value Ref Range Status   04/15/2024 3.5 3.4 - 4.8 g/dL Final     Bilirubin Total   Date Value Ref Range Status   04/15/2024 0.3 <=1.5 mg/dL Final     ALP   Date Value Ref Range Status   04/15/2024 111 40 - 150 unit/L Final     AST   Date Value Ref Range Status   04/15/2024 13 5 - 34 unit/L Final     ALT   Date Value Ref Range Status   04/15/2024 17 0 - 55 unit/L Final     eGFR   Date Value Ref Range Status   04/15/2024 >60 mls/min/1.73/m2 Final     Lab Results   Component Value Date    HGBA1C 7.7 (H) 11/29/2023       Past anesthesia records: Hydrocelectomy          Anesthesia Plan  Type of Anesthesia, risks & benefits discussed:    Anesthesia Type: MAC  Intra-op Monitoring Plan: Standard ASA Monitors  Post Op Pain Control Plan: IV/PO Opioids PRN  Induction:  IV  Informed Consent: Informed consent signed with the Patient and all parties understand the risks and agree with anesthesia plan.  All questions answered.   ASA Score: 3  Day of Surgery Review of History & Physical: H&P Update referred to the surgeon/provider.    Ready For Surgery From Anesthesia Perspective.     .

## 2024-07-29 ENCOUNTER — PATIENT MESSAGE (OUTPATIENT)
Dept: SURGERY | Facility: HOSPITAL | Age: 69
End: 2024-07-29
Payer: COMMERCIAL

## 2024-07-30 ENCOUNTER — ANESTHESIA (OUTPATIENT)
Dept: SURGERY | Facility: HOSPITAL | Age: 69
End: 2024-07-30
Payer: COMMERCIAL

## 2024-07-30 ENCOUNTER — HOSPITAL ENCOUNTER (OUTPATIENT)
Facility: HOSPITAL | Age: 69
Discharge: HOME | End: 2024-07-30
Attending: OPHTHALMOLOGY | Admitting: OPHTHALMOLOGY
Payer: COMMERCIAL

## 2024-07-30 VITALS
OXYGEN SATURATION: 97 % | TEMPERATURE: 98 F | BODY MASS INDEX: 31.47 KG/M2 | RESPIRATION RATE: 18 BRPM | WEIGHT: 207 LBS | DIASTOLIC BLOOD PRESSURE: 80 MMHG | SYSTOLIC BLOOD PRESSURE: 122 MMHG | HEART RATE: 65 BPM

## 2024-07-30 DIAGNOSIS — Z01.810 PREOP CARDIOVASCULAR EXAM: ICD-10-CM

## 2024-07-30 DIAGNOSIS — H25.811 COMBINED FORM OF AGE-RELATED CATARACT, RIGHT EYE: ICD-10-CM

## 2024-07-30 LAB
OHS QRS DURATION: 88 MS
OHS QTC CALCULATION: 409 MS
POCT GLUCOSE: 126 MG/DL (ref 70–110)
POCT GLUCOSE: 161 MG/DL (ref 70–110)

## 2024-07-30 PROCEDURE — 36000707: Performed by: OPHTHALMOLOGY

## 2024-07-30 PROCEDURE — 25000003 PHARM REV CODE 250: Performed by: ANESTHESIOLOGY

## 2024-07-30 PROCEDURE — 37000009 HC ANESTHESIA EA ADD 15 MINS: Performed by: OPHTHALMOLOGY

## 2024-07-30 PROCEDURE — 82962 GLUCOSE BLOOD TEST: CPT | Performed by: OPHTHALMOLOGY

## 2024-07-30 PROCEDURE — 63600175 PHARM REV CODE 636 W HCPCS: Performed by: ANESTHESIOLOGY

## 2024-07-30 PROCEDURE — 25000003 PHARM REV CODE 250

## 2024-07-30 PROCEDURE — V2632 POST CHMBR INTRAOCULAR LENS: HCPCS | Performed by: OPHTHALMOLOGY

## 2024-07-30 PROCEDURE — 63600175 PHARM REV CODE 636 W HCPCS: Performed by: OPHTHALMOLOGY

## 2024-07-30 PROCEDURE — 37000008 HC ANESTHESIA 1ST 15 MINUTES: Performed by: OPHTHALMOLOGY

## 2024-07-30 PROCEDURE — 93005 ELECTROCARDIOGRAM TRACING: CPT

## 2024-07-30 PROCEDURE — 36000706: Performed by: OPHTHALMOLOGY

## 2024-07-30 PROCEDURE — 25000003 PHARM REV CODE 250: Performed by: OPHTHALMOLOGY

## 2024-07-30 PROCEDURE — 63600175 PHARM REV CODE 636 W HCPCS: Performed by: NURSE ANESTHETIST, CERTIFIED REGISTERED

## 2024-07-30 PROCEDURE — 71000015 HC POSTOP RECOV 1ST HR: Performed by: OPHTHALMOLOGY

## 2024-07-30 RX ORDER — LIDOCAINE HYDROCHLORIDE 10 MG/ML
INJECTION, SOLUTION EPIDURAL; INFILTRATION; INTRACAUDAL; PERINEURAL
Status: DISCONTINUED | OUTPATIENT
Start: 2024-07-30 | End: 2024-07-30 | Stop reason: HOSPADM

## 2024-07-30 RX ORDER — INSULIN ASPART 100 [IU]/ML
0-5 INJECTION, SOLUTION INTRAVENOUS; SUBCUTANEOUS ONCE AS NEEDED
Status: DISCONTINUED | OUTPATIENT
Start: 2024-07-30 | End: 2024-07-30 | Stop reason: HOSPADM

## 2024-07-30 RX ORDER — CYCLOPENTOLAT/TROPIC/PHENYLEPH 1%-1%-2.5%
1 DROPS (EA) OPHTHALMIC (EYE)
Status: COMPLETED | OUTPATIENT
Start: 2024-07-30 | End: 2024-07-30

## 2024-07-30 RX ORDER — EPINEPHRINE 1 MG/ML
INJECTION INTRAMUSCULAR; INTRAVENOUS; SUBCUTANEOUS
Status: DISCONTINUED | OUTPATIENT
Start: 2024-07-30 | End: 2024-07-30 | Stop reason: HOSPADM

## 2024-07-30 RX ORDER — FLURBIPROFEN SODIUM 0.3 MG/ML
SOLUTION/ DROPS OPHTHALMIC
Status: DISCONTINUED | OUTPATIENT
Start: 2024-07-30 | End: 2024-07-30 | Stop reason: HOSPADM

## 2024-07-30 RX ORDER — TETRACAINE HYDROCHLORIDE 5 MG/ML
1 SOLUTION OPHTHALMIC
Status: DISCONTINUED | OUTPATIENT
Start: 2024-07-30 | End: 2024-07-30 | Stop reason: HOSPADM

## 2024-07-30 RX ORDER — PREDNISOLONE ACETATE 10 MG/ML
SUSPENSION/ DROPS OPHTHALMIC
Status: DISCONTINUED | OUTPATIENT
Start: 2024-07-30 | End: 2024-07-30 | Stop reason: HOSPADM

## 2024-07-30 RX ORDER — SODIUM CHLORIDE 0.9 % (FLUSH) 0.9 %
2 SYRINGE (ML) INJECTION ONCE
Status: DISCONTINUED | OUTPATIENT
Start: 2024-07-30 | End: 2024-07-30 | Stop reason: HOSPADM

## 2024-07-30 RX ORDER — MOXIFLOXACIN 5 MG/ML
SOLUTION/ DROPS OPHTHALMIC
Status: DISCONTINUED | OUTPATIENT
Start: 2024-07-30 | End: 2024-07-30 | Stop reason: HOSPADM

## 2024-07-30 RX ORDER — FENTANYL CITRATE 50 UG/ML
INJECTION, SOLUTION INTRAMUSCULAR; INTRAVENOUS
Status: DISCONTINUED | OUTPATIENT
Start: 2024-07-30 | End: 2024-07-30

## 2024-07-30 RX ORDER — GLUCAGON 1 MG
1 KIT INJECTION
Status: DISCONTINUED | OUTPATIENT
Start: 2024-07-30 | End: 2024-07-30 | Stop reason: HOSPADM

## 2024-07-30 RX ORDER — MIDAZOLAM HYDROCHLORIDE 2 MG/2ML
2 INJECTION, SOLUTION INTRAMUSCULAR; INTRAVENOUS ONCE AS NEEDED
Status: COMPLETED | OUTPATIENT
Start: 2024-07-30 | End: 2024-07-30

## 2024-07-30 RX ORDER — SODIUM CHLORIDE 9 MG/ML
INJECTION, SOLUTION INTRAVENOUS CONTINUOUS
Status: DISCONTINUED | OUTPATIENT
Start: 2024-07-30 | End: 2024-07-30 | Stop reason: HOSPADM

## 2024-07-30 RX ORDER — MIDAZOLAM HYDROCHLORIDE 1 MG/ML
INJECTION INTRAMUSCULAR; INTRAVENOUS
Status: DISCONTINUED | OUTPATIENT
Start: 2024-07-30 | End: 2024-07-30

## 2024-07-30 RX ADMIN — FENTANYL CITRATE 25 MCG: 50 INJECTION INTRAMUSCULAR; INTRAVENOUS at 12:07

## 2024-07-30 RX ADMIN — MIDAZOLAM HYDROCHLORIDE 2 MG: 1 INJECTION, SOLUTION INTRAMUSCULAR; INTRAVENOUS at 11:07

## 2024-07-30 RX ADMIN — TETRACAINE HYDROCHLORIDE 1 DROP: 5 SOLUTION OPHTHALMIC at 10:07

## 2024-07-30 RX ADMIN — FENTANYL CITRATE 50 MCG: 50 INJECTION INTRAMUSCULAR; INTRAVENOUS at 11:07

## 2024-07-30 RX ADMIN — MIDAZOLAM HYDROCHLORIDE 1 MG: 1 INJECTION, SOLUTION INTRAMUSCULAR; INTRAVENOUS at 11:07

## 2024-07-30 RX ADMIN — Medication 1 DROP: at 10:07

## 2024-07-30 RX ADMIN — SODIUM CHLORIDE: 9 INJECTION, SOLUTION INTRAVENOUS at 11:07

## 2024-07-30 NOTE — DISCHARGE SUMMARY
Discharge Summary  Ophthalmology Service    Admit Date: 7/30/2024     Discharge Date: 7/30/2024     Attending Physician: Sebastian Samuels MD     Discharge Physician: Same    Discharged Condition: Good    Reason for Admission: Combined form of age-related cataract, right eye [H25.811]     Treatments/Procedures: Procedure(s) (LRB):  PHACOEMULSIFICATION, CATARACT (Right) (see dictated report for details)    Hospital Course: Stable    Consults: None    Significant Diagnostic Studies: None     Disposition: Home    Patient Instructions:   - Resume same diet as prior to surgery  - Limit rubbing/touching eyes. Keep patch on until tomorrow appointment except when applying drops  - No strenuous activity, heavy lifting (anything over 10lb), bending below waist for one week  - No swimming for 2 weeks  - Start using prednisolone acetate 1% (pred forte), ketorolac, and moxifloxacin drops FOUR times daily in operated eye  - Bring drops to tomorrow's appointment  - Follow-up in clinic tomorrow for post-op day 1    Patient Instructions:   Current Discharge Medication List        CONTINUE these medications which have NOT CHANGED    Details   aspirin (ECOTRIN) 81 MG EC tablet Take 1 tablet (81 mg total) by mouth once daily.  Qty: 90 tablet, Refills: 3      atorvastatin (LIPITOR) 40 MG tablet Take 1 tablet (40 mg total) by mouth once daily.  Qty: 90 tablet, Refills: 3    Associated Diagnoses: Hyperlipidemia, unspecified hyperlipidemia type      fluticasone propionate (FLONASE) 50 mcg/actuation nasal spray See Instructions, USE 1 SPRAY(S) IN EACH NOSTRIL TWICE DAILY FOR 30 DAYS, # 16 gm, 0 Refill(s), Pharmacy: [facility], 174, cm, Height/Length Dosing, 09/13/21 10:53:00 CDT, 94.3, kg, Weight Dosing, 09/13/21 10:53:00 CDT  Qty: 9.9 mL, Refills: 0      glipiZIDE (GLUCOTROL) 10 MG tablet Take 1 tablet (10 mg total) by mouth 2 (two) times daily.  Qty: 720 tablet, Refills: 0    Associated Diagnoses: Type 2 diabetes mellitus  without complication, without long-term current use of insulin      icosapent ethyL (VASCEPA) 1 gram Cap Take 1 capsule (1,000 mg total) by mouth once daily.  Qty: 90 capsule, Refills: 1      JANUVIA 100 mg Tab Take 1 tablet (100 mg total) by mouth once daily.  Qty: 90 tablet, Refills: 3    Associated Diagnoses: Type 2 diabetes mellitus without complication, without long-term current use of insulin      losartan (COZAAR) 25 MG tablet Take 1 tablet (25 mg total) by mouth 2 (two) times daily.  Qty: 180 tablet, Refills: 3    Comments: .  Associated Diagnoses: Hypertension, unspecified type      metFORMIN (GLUCOPHAGE) 1000 MG tablet Take 1 tablet (1,000 mg total) by mouth 2 (two) times daily.  Qty: 180 tablet, Refills: 3    Associated Diagnoses: Type 2 diabetes mellitus without complication, without long-term current use of insulin      metoprolol tartrate (LOPRESSOR) 50 MG tablet Take 1 tablet (50 mg total) by mouth 2 (two) times daily.  Qty: 180 tablet, Refills: 3    Comments: .      carbamide peroxide (DEBROX) 6.5 % otic solution Place 5 drops into both ears 2 (two) times daily.  Qty: 15 mL, Refills: 0             No discharge procedures on file.      Aries Bhatia MD  [employer] Ophthalmology PGY-4

## 2024-07-30 NOTE — TRANSFER OF CARE
Anesthesia Transfer of Care Note    Patient: Nash Marley    Procedure(s) Performed: Procedure(s) (LRB):  PHACOEMULSIFICATION, CATARACT (Right)    Patient location: OPS    Anesthesia Type: MAC    Transport from OR: Transported from OR on room air with adequate spontaneous ventilation    Post pain: adequate analgesia    Post assessment: no apparent anesthetic complications    Post vital signs: stable    Level of consciousness: awake    Nausea/Vomiting: no nausea/vomiting    Complications: none    Transfer of care protocol was followed

## 2024-07-30 NOTE — OP NOTE
Operative Note  Ophthalmology Service    Date of Procedure:  7/30/2024    Resident:  Aries Bhatia MD    Staff Physician: Sebastian Samuels MD    Pre-Operative Diagnosis: Cataract OD    Post-Operative Diagnosis: Same as pre-operative diagnosis    Treatments/Procedures Performed:   1. Cataract extraction with phacoemulsification and posterior chamber intraocular lens placement OD    Intraoperative Findings: Combined form age-related cataract, right eye    Anesthesia: Monitored anesthesia care    Complications: None    Estimated Blood Loss: None    Implant:    Implant Name Type Inv. Item Serial No.  Lot No. LRB No. Used Action   enVista hydrophobic carylic intraocular lens      Right 1 Implanted     +[id number]    Indication for Procedure:  The patient had a history of painless progressive vision loss interfering with activities of daily living.  Risks, benefits, alternatives, and complications were discussed thoroughly with the patient. After the opportunity to ask questions, the patient expressed understanding and a desire to proceed with the procedure. Informed consent was obtained, signed, and witnessed, and placed in the chart prior.     Procedure in detail:   The patient was brought to the operating room and placed in supine position.  A time out was performed including patient's name, date of birth, anticipated procedure, surgical site location, and allergies.  After adequate anesthesia was achieved, the patient was prepped and draped in sterile fashion using topical Betadine.     A sideport blade was used to make a paracentesis wound. A 2% preservative-free lidocaine was injected into the anterior chamber, followed by trypan blue and then Viscoat. A 2.4 mm keratome blade was then used to make a clear corneal triplanar wound. A cystotome was used to make a tear in the anterior capsular flap, which was continued around with Utrata forceps to complete a continuous curvilinear capsulorhexis. BSS was  then used for hydrodissection. The lens was noted to spin freely in the bag. The lens was then removed in a [employer] manner with the phacoemulsification handpiece. Irrigation and aspiration handpiece was then used to remove the remaining cortical material. Provisc was then used to fill the capsular bag and the lens as mentioned above was placed in the bag. The I&A was used to remove the remaining Provisc, and the wounds were hydrated with BSS. The wounds were noted to be watertight. The eye was noted to have a good physiological pressure.     The lid speculum was removed under direct visualization. Topical Vigamox, Pred-Forte, and Acular eye drops were placed in the eye. The eye was then covered with a patch and shield. The patient tolerated the procedure well without complications. The patient was brought to the recovery room in stable condition.  The patient was given instructions regarding post-operative care and the importance of follow-up.

## 2024-07-30 NOTE — H&P
Pre-operative History and Physical  Ophthalmology Service    CC: Blurry vision    HPI:   Patient is a 69 y.o. male presents with cataract requiring surgery as noted in the most recent ophthalmology progress note.    Past Medical History:   Diagnosis Date    Allergy     Diabetes mellitus, type 2     Heart attack     Hyperlipidemia     Hypertension        Past Surgical History:   Procedure Laterality Date    CORONARY ANGIOPLASTY WITH STENT PLACEMENT      in 2003, 2 placed       Family History   Problem Relation Name Age of Onset    Hypertension Mother      Heart disease Mother      Diabetes Mother      Stroke Mother      Hypertension Father      Heart disease Father      COPD Brother      Asthma Brother         Social History     Socioeconomic History    Marital status:     Number of children: 3   Occupational History    Occupation: retired   Tobacco Use    Smoking status: Former     Types: Cigarettes    Smokeless tobacco: Never   Substance and Sexual Activity    Alcohol use: Yes     Alcohol/week: 6.0 standard drinks of alcohol     Types: 6 Cans of beer per week     Comment: on weekends    Drug use: Never     Social Determinants of Health     Financial Resource Strain: Low Risk  (12/5/2023)    Overall Financial Resource Strain (CARDIA)     Difficulty of Paying Living Expenses: Not hard at all   Food Insecurity: No Food Insecurity (12/5/2023)    Hunger Vital Sign     Worried About Running Out of Food in the Last Year: Never true     Ran Out of Food in the Last Year: Never true   Transportation Needs: No Transportation Needs (12/5/2023)    PRAPARE - Transportation     Lack of Transportation (Medical): No     Lack of Transportation (Non-Medical): No   Physical Activity: Insufficiently Active (12/5/2023)    Exercise Vital Sign     Days of Exercise per Week: 4 days     Minutes of Exercise per Session: 30 min   Stress: No Stress Concern Present (12/5/2023)    [employer] - Occupational  Stress Questionnaire     Feeling of Stress : Not at all   Housing Stability: Low Risk  (12/5/2023)    Housing Stability Vital Sign     Unable to Pay for Housing in the Last Year: No     Number of Places Lived in the Last Year: 1     Unstable Housing in the Last Year: No       Current Facility-Administered Medications   Medication Dose Route Frequency Provider Last Rate Last Admin    0.9%  NaCl infusion   Intravenous Continuous Renae Treviño MD        dextrose 10% bolus 125 mL 125 mL  12.5 g Intravenous PRN Shikha Levy, AUDREYP        dextrose 10% bolus 250 mL 250 mL  25 g Intravenous PRN Jennifer Levykin, FNP        glucagon (human recombinant) injection 1 mg  1 mg Intramuscular PRN Shikha Levy, AUDREYP        insulin aspart U-100 injection 0-5 Units  0-5 Units Subcutaneous Once PRN Shikha Levy, AUDREYP        midazolam (PF) (VERSED) 1 mg/mL injection 2 mg  2 mg Intravenous Once PRN Renae Treviño MD        sodium chloride 0.9% flush 2 mL  2 mL Intravenous Once Daniel Whiteside MD        TETRAcaine HCl (PF) 0.5 % Drop 1 drop  1 drop Right Eye On Call Procedure Daniel Whiteside MD        tropicamide/cyclopentolate/PHENYLephrine (1%-1%-2.5%) OPHThalmic solution (0.5 mL)  1 drop Right Eye Q5 Min Aries Bhatia MD           Review of patient's allergies indicates:  No Known Allergies      Review of systems:  Gen: denies fevers, chills, nausea, vomitting, weight changes  HEENT: Denies discharge or coughing/sneezing. +blurry vision  Resp: Denies SOB  CV: Denies CP or palpitations  Abd: Denies tenderness, diarrhea, constipation, nausea  Ext:  Denies pain or edema    Physical Exam  BP: Vital signs stable  General: No apparent distress  HEENT: Normocephalic, atraumatic  Lungs: Adequate respirations, no respiratory distress  Heart: Pulses intact  Abdomen: Soft, no distention  Rectal/pelvic: Deferred    Assessment  Patient is a 69 y.o. male with cataracts, right eye   - Risks/benefits/alternatives of the  procedure including, but not limited to bleeding, infection, loss or decreased vision, injury to lens capsule/retina, and need for possible repeat surgery discussed with the patient and/or family, and informed consent obtained prior to surgery and the patient/family voiced good understanding, witnessed, and placed in chart.  - Will proceed with cataract surgery, right eye  - Plan for local/MAC  - Plan for +17D [id number]

## 2024-07-30 NOTE — ANESTHESIA POSTPROCEDURE EVALUATION
Anesthesia Post Evaluation    Patient: Nash Marley    Procedure(s) Performed: Procedure(s) (LRB):  PHACOEMULSIFICATION, CATARACT (Right)    Final Anesthesia Type: MAC      Patient location during evaluation: OPS  Patient participation: Yes- Able to Participate  Level of consciousness: awake and alert  Post-procedure vital signs: reviewed and stable  Pain management: adequate  Airway patency: patent    PONV status at discharge: No PONV  Anesthetic complications: no      Cardiovascular status: hemodynamically stable  Respiratory status: unassisted, room air and spontaneous ventilation  Hydration status: euvolemic  Follow-up not needed.

## 2024-07-30 NOTE — DISCHARGE INSTRUCTIONS
· Keep follow up appointment tomorrow _July 31st @ 12:30 PM____am at the [facility].     +++Bring drops with you to clinic     ` Resume home medications unless otherwise instructed by your doctor.  ***  Instill eye drops 3 more times today (when you get home, @ evening meal and at bedtime).  Starting tomorrow, instill eye drops 4 times daily.      · No bending, lifting, stooping or straining until cleared by MD.    · Keep patch on until follow up appointment and while asleep at home to protect your eye.    · May take Tylenol or Ibuprofen for pain or discomfort if no allergies or contraindications.      ` No driving or consuming alcohol for 24 hours after anesthesia.    · Notify MD if you experience:    · Pain that is unrelieved by over the counter medicines    · if you feel increased pressure in your eye or sharp pain in the eye    · you have excessive, colored, or thick drainage coming from eye    · you see curtain-like darkening in the eye, flashes of light, or other sudden vision changes    · if you have any nausea or vomiting    · fever above 100.4F    · The clinics number is [phone]. If it is after business hours or emergency call [phone].  Thanks for choosing [facility]! Have a smooth recovery!

## 2024-07-30 NOTE — BRIEF OP NOTE
Brief Operative Note  Ophthalmology Service    Date of Procedure: 7/30/2024     Attending Physician: Sebastian Samuels MD    Resident: Aries Bhatia MD    Pre-Operative Diagnosis: Combined form of age-related cataract, right eye [H25.811]     Post-Operative Diagnosis: Same as pre-operative diagnosis    Treatments/Procedures:   Procedure(s) (LRB):  PHACOEMULSIFICATION, CATARACT (Right)    Intraoperative Findings: None    Anesthesia: Local Mac    Complications: None    Estimated Blood Loss: < 5 cc    Specimens: None    -------------------------------------------------------------  Full dictated Operative Report to follow.  -------------------------------------------------------------

## 2024-07-31 ENCOUNTER — OFFICE VISIT (OUTPATIENT)
Dept: OPHTHALMOLOGY | Facility: CLINIC | Age: 69
End: 2024-07-31
Payer: COMMERCIAL

## 2024-07-31 VITALS — HEIGHT: 68 IN | BODY MASS INDEX: 31.41 KG/M2 | WEIGHT: 207.25 LBS

## 2024-07-31 DIAGNOSIS — Z98.890 POST-OPERATIVE STATE: Primary | ICD-10-CM

## 2024-07-31 PROCEDURE — 99214 OFFICE O/P EST MOD 30 MIN: CPT | Mod: PBBFAC,PN

## 2024-07-31 RX ORDER — BRIMONIDINE TARTRATE 2 MG/ML
1 SOLUTION/ DROPS OPHTHALMIC
Status: COMPLETED | OUTPATIENT
Start: 2024-07-31 | End: 2024-07-31

## 2024-07-31 RX ADMIN — BRIMONIDINE TARTRATE 1 DROP: 2 SOLUTION/ DROPS OPHTHALMIC at 07:07

## 2024-07-31 NOTE — PROGRESS NOTES
HPI    POD 1 - PHACOEMULSIFICATION, CATARACT (Right: Eye)  Last edited by Nancy Horton RN on 7/31/2024  7:26 AM.          Assessment /Plan     For exam results, see Encounter Report.    Post-operative state  -     fluorescein ophthalmic strip 1 each    Other orders  -     brimonidine 0.2% ophthalmic solution 1 drop        OCT Macula 1/22/24  OD: 270, normal foveal contour, no IRF or SRF  OS: 278, normal foveal contour, no IRF or SRF    OCT RNFL 1/22/24  OD: 82, all green  OS: 88, all green      ASSESSMENT / PLAN    1. Pseudophakia, OD  - DOS: 7/30/24  - 7/31/24: VA 20/40, IOP 39. Topical brimonidine applied in clinic.  - Doing well, james negative  - Continue PredForte QID, Ketorolac QID, and Vigamox QID. Start brimonidine TID OD.  - Nocturnal shield for 1 week, no heavy lifting, bending, stooping, or straining for 1 week  - No swimming for 2 weeks  - Return immediately for pain, redness or loss of vision (informed of our emergency department which is open 24/7).  - RD and endophthalmitis precautions given  - Written instructions provided to patient upon discharge  - RTC general 1 week    2) DM II w/o Retinopathy, OU  - Last A1c as below:  (7.4)  04/22/2024  - No signs of retinopathy on exam  - No signs of DME on OCT mac  - Encouraged good BS/BP/Cholesterol control, f/u with PCP regularly  - Monitor with annual DFE (next due 7/22/2025)    3) PSK, OS  - BCVA 20/20    RTC POW1 as scheduled

## 2024-08-07 ENCOUNTER — OFFICE VISIT (OUTPATIENT)
Dept: OPHTHALMOLOGY | Facility: CLINIC | Age: 69
End: 2024-08-07
Payer: COMMERCIAL

## 2024-08-07 VITALS — HEIGHT: 68 IN | BODY MASS INDEX: 31.41 KG/M2 | WEIGHT: 207.25 LBS

## 2024-08-07 DIAGNOSIS — Z98.890 POST-OPERATIVE STATE: Primary | ICD-10-CM

## 2024-08-07 PROCEDURE — 99213 OFFICE O/P EST LOW 20 MIN: CPT | Mod: PBBFAC,PN

## 2024-08-07 RX ORDER — PREDNISOLONE ACETATE 10 MG/ML
1 SUSPENSION/ DROPS OPHTHALMIC 4 TIMES DAILY
Qty: 5 ML | Refills: 0 | Status: SHIPPED | OUTPATIENT
Start: 2024-08-07 | End: 2025-08-07

## 2024-08-07 RX ORDER — KETOROLAC TROMETHAMINE 5 MG/ML
1 SOLUTION OPHTHALMIC 4 TIMES DAILY
Qty: 5 ML | Refills: 0 | Status: SHIPPED | OUTPATIENT
Start: 2024-08-07 | End: 2025-08-07

## 2024-09-04 ENCOUNTER — OFFICE VISIT (OUTPATIENT)
Dept: OPHTHALMOLOGY | Facility: CLINIC | Age: 69
End: 2024-09-04
Payer: COMMERCIAL

## 2024-09-04 VITALS — WEIGHT: 207.25 LBS | BODY MASS INDEX: 31.41 KG/M2 | HEIGHT: 68 IN

## 2024-09-04 DIAGNOSIS — Z98.890 POST-OPERATIVE STATE: Primary | ICD-10-CM

## 2024-09-04 PROCEDURE — 99213 OFFICE O/P EST LOW 20 MIN: CPT | Mod: PBBFAC,PN

## 2024-09-04 RX ORDER — PHENYLEPH/TROPICAMIDE IN WATER 2.5 %-1 %
1 DROPS OPHTHALMIC (EYE) ONCE
Status: COMPLETED | OUTPATIENT
Start: 2024-09-04 | End: 2024-09-04

## 2024-09-04 RX ADMIN — Medication 1 DROP: at 12:09

## 2024-09-04 NOTE — PROGRESS NOTES
HPI    POM 1 - PHACOEMULSIFICATION, CATARACT, WITH IOL INSERTION (Right: Eye)    C/O more difficult with reading small print since surgery  Last edited by Nancy Horton RN on 9/4/2024 12:51 PM.            Assessment /Plan     For exam results, see Encounter Report.    Post-operative state  -     tropicamide /PHENYLephrine opthalmic solution 1 drop          OCT Macula 1/22/24  OD: 270, normal foveal contour, no IRF or SRF  OS: 278, normal foveal contour, no IRF or SRF    OCT RNFL 1/22/24  OD: 82, all green  OS: 88, all green      ASSESSMENT / PLAN    1. Pseudophakia, OD  - DOS: 7/30/24  - 7/31/24: VA 20/40, IOP 39. Topical brimonidine applied in clinic.  - 8/7/13 POW1: VA 20/20*, IOP 17  - 9/4/24: POM1. DFE normal. Eye quiet. BCVA 20/20. Mrx dispensed.     2) DM II w/o Retinopathy, OU  - Last A1c as below:  (7.4)  04/22/2024  - No signs of retinopathy on exam  - No signs of DME on OCT mac  - Encouraged good BS/BP/Cholesterol control, f/u with PCP regularly  - Monitor with annual DFE (next due 7/22/2025)    3) PSK, OS  - BCVA 20/20    RTC 9-12 months DFE

## 2024-10-29 ENCOUNTER — LAB VISIT (OUTPATIENT)
Dept: LAB | Facility: HOSPITAL | Age: 69
End: 2024-10-29
Attending: STUDENT IN AN ORGANIZED HEALTH CARE EDUCATION/TRAINING PROGRAM
Payer: COMMERCIAL

## 2024-10-29 DIAGNOSIS — E11.9 TYPE 2 DIABETES MELLITUS WITHOUT COMPLICATION, WITHOUT LONG-TERM CURRENT USE OF INSULIN: ICD-10-CM

## 2024-10-29 LAB
CHOLEST SERPL-MCNC: 104 MG/DL
CHOLEST/HDLC SERPL: 4 {RATIO} (ref 0–5)
EST. AVERAGE GLUCOSE BLD GHB EST-MCNC: 177.2 MG/DL
HBA1C MFR BLD: 7.8 %
HDLC SERPL-MCNC: 29 MG/DL (ref 35–60)
LDLC SERPL CALC-MCNC: 38 MG/DL (ref 50–140)
TRIGL SERPL-MCNC: 185 MG/DL (ref 34–140)
VLDLC SERPL CALC-MCNC: 37 MG/DL

## 2024-10-29 PROCEDURE — 83036 HEMOGLOBIN GLYCOSYLATED A1C: CPT

## 2024-10-29 PROCEDURE — 36415 COLL VENOUS BLD VENIPUNCTURE: CPT

## 2024-10-29 PROCEDURE — 80061 LIPID PANEL: CPT

## 2024-11-04 ENCOUNTER — HOSPITAL ENCOUNTER (OUTPATIENT)
Dept: RADIOLOGY | Facility: HOSPITAL | Age: 69
Discharge: HOME | End: 2024-11-04
Attending: STUDENT IN AN ORGANIZED HEALTH CARE EDUCATION/TRAINING PROGRAM
Payer: COMMERCIAL

## 2024-11-04 ENCOUNTER — OFFICE VISIT (OUTPATIENT)
Dept: INTERNAL MEDICINE | Facility: CLINIC | Age: 69
End: 2024-11-04
Payer: COMMERCIAL

## 2024-11-04 VITALS
HEART RATE: 60 BPM | SYSTOLIC BLOOD PRESSURE: 133 MMHG | WEIGHT: 211.19 LBS | OXYGEN SATURATION: 99 % | RESPIRATION RATE: 18 BRPM | BODY MASS INDEX: 32.01 KG/M2 | TEMPERATURE: 98 F | HEIGHT: 68 IN | DIASTOLIC BLOOD PRESSURE: 74 MMHG

## 2024-11-04 DIAGNOSIS — M25.511 ACUTE PAIN OF RIGHT SHOULDER: ICD-10-CM

## 2024-11-04 DIAGNOSIS — Z23 NEED FOR INFLUENZA VACCINATION: Primary | ICD-10-CM

## 2024-11-04 DIAGNOSIS — E78.5 HYPERLIPIDEMIA, UNSPECIFIED HYPERLIPIDEMIA TYPE: ICD-10-CM

## 2024-11-04 DIAGNOSIS — E11.9 TYPE 2 DIABETES MELLITUS WITHOUT COMPLICATION, WITHOUT LONG-TERM CURRENT USE OF INSULIN: ICD-10-CM

## 2024-11-04 PROCEDURE — G0008 ADMIN INFLUENZA VIRUS VAC: HCPCS | Mod: PBBFAC

## 2024-11-04 PROCEDURE — 73030 X-RAY EXAM OF SHOULDER: CPT | Mod: TC,RT

## 2024-11-04 PROCEDURE — 99214 OFFICE O/P EST MOD 30 MIN: CPT | Mod: PBBFAC,25 | Performed by: STUDENT IN AN ORGANIZED HEALTH CARE EDUCATION/TRAINING PROGRAM

## 2024-11-04 PROCEDURE — 90653 IIV ADJUVANT VACCINE IM: CPT | Mod: PBBFAC

## 2024-11-04 RX ORDER — FENOFIBRATE 54 MG/1
54 TABLET ORAL DAILY
Qty: 90 TABLET | Refills: 0 | Status: SHIPPED | OUTPATIENT
Start: 2024-11-04 | End: 2025-02-02

## 2024-11-04 RX ADMIN — INFLUENZA A VIRUS A/VICTORIA/4897/2022 IVR-238 (H1N1) ANTIGEN (FORMALDEHYDE INACTIVATED), INFLUENZA A VIRUS A/THAILAND/8/2022 IVR-237 (H3N2) ANTIGEN (FORMALDEHYDE INACTIVATED), INFLUENZA B VIRUS B/AUSTRIA/1359417/2021 BVR-26 ANTIGEN (FORMALDEHYDE INACTIVATED) 0.5 ML: 15; 15; 15 INJECTION, SUSPENSION INTRAMUSCULAR at 08:11

## 2024-11-04 NOTE — PROGRESS NOTES
"    [facility]    Subjective:      66 year old  male Diabetes, HTN, CAD S/P stent 20 years ago , HLD.  Denies any chest pain, shortness of breath, fever, chills, nausea, vomiting, abdominal pain  or any weakness at this time. Refilled meds at this time          11/4/24: Complaints of right shoulder pain worse with external rotation and abduction, pain while raising his right arm above 90 degrees. No swelling, skin changes noted on right shoulder joint. Denies any weakness, numbness or tingling in extremities on the right arm. Patient stated that he had similar complaints before years ago and patient went to urgent care and had gotten cortisone injection for bursitis. Denies any trauma, or injury to right arm.     Review of Systems:  10 point ROS negative except for HPI    Objective:   Vital Signs:  Vitals:    11/04/24 0830   BP: 133/74   BP Location: Left arm   Patient Position: Sitting   Pulse: 60   Resp: 18   Temp: 97.9 °F (36.6 °C)   TempSrc: Oral   SpO2: 99%   Weight: 95.8 kg (211 lb 3.2 oz)   Height: 5' 8" (1.727 m)             There is no height or weight on file to calculate BMI.     General: Alert and oriented, No acute distress.  Respiratory: Lungs are clear to auscultation, Respirations are non-labored, Breath sounds  are equal, Symmetrical chest wall expansion, No chest wall tenderness.  Cardiovascular: Normal rate, Regular rhythm, No murmur, No gallop, Good pulses equal in  all extremities, Normal peripheral perfusion, No edema.  Musculoskeletal:Normal range of motion, Normal strength, No tenderness, No swelling,  No deformity, Normal gait.  Integumentary: Warm, Pink, Intact, Moist, No pallor, No rash.  Cognition and Speech: Oriented, Speech clear and coherent, Functional cognition intact.  Neuro exam : empty can test positive on Right shoulder, Tenderness on right shoulder while raising his arm nichelle than 90 degrees, sensation and motor exam intact otherwise. " "        Laboratory:  Lab Results   Component Value Date    WBC 7.32 04/15/2024    HGB 13.9 (L) 04/15/2024    HCT 43.5 04/15/2024     04/15/2024    MCV 88.2 04/15/2024    RDW 15.0 04/15/2024    Lab Results   Component Value Date     04/15/2024    K 4.1 04/15/2024     04/15/2024    CO2 23 04/15/2024    BUN 10.4 04/15/2024    CREATININE 1.19 (H) 04/15/2024    CALCIUM 8.6 (L) 04/15/2024      Lab Results   Component Value Date    HGBA1C 7.8 (H) 10/29/2024    .2 10/29/2024    CREATININE 1.19 (H) 04/15/2024    CREATRANDUR 356.9 (H) 04/15/2024    PROTEINURINE 18.7 04/15/2024    No results found for: "TSH", "GSBRVR1LDAN", "L5WPNQD", "I8QSNSH", "THYROIDAB"             Current Medications:  Current Outpatient Medications   Medication Instructions    aspirin (ECOTRIN) 81 mg, Oral, Daily    atorvastatin (LIPITOR) 40 mg, Oral, Daily    fluticasone propionate (FLONASE) 50 mcg/actuation nasal spray See Instructions, USE 1 SPRAY(S) IN EACH NOSTRIL TWICE DAILY FOR 30 DAYS, # 16 gm, 0 Refill(s), Pharmacy: [id number], 174, cm, Height/Length Dosing, 09/13/21 10:53:00 CDT, 94.3, kg, Weight Dosing, 09/13/21 10:53:00 CDT    glipiZIDE (GLUCOTROL) 10 mg, Oral, 2 times daily    icosapent ethyL (VASCEPA) 1,000 mg, Oral, Daily    JANUVIA 100 mg, Oral, Daily    losartan (COZAAR) 25 mg, Oral, 2 times daily    metFORMIN (GLUCOPHAGE) 1,000 mg, Oral, 2 times daily    metoprolol tartrate (LOPRESSOR) 50 mg, Oral, 2 times daily        Assessment and Plan:      Left heel pain  Likely Plantar fascitis -resolved  -Left heel pain  - Xray of the left foot neg    Diabetes mellitus type II - Controlled  -POC 6.8 >7.7>7.3>7.8  -continue Januvia (sitagliptin) 100mg PO daily  -continue metformin 1000mg BID and glipizide 10mg BID   - pt stated he has been not eating correctly and not following diabetic diet; Will   Advised BS log   - Recheck in 3 months  - DM eye exam uptd 9/24 Follows with optho outpt  -Foot exam uptd "   -A1c at next visit     Hypertension  -normotensive 133/74  -continue metoprolol tartrate 50mg BID & losartan to 25mg BID (convenient dosing as  metoprolol already given BID)    L Hydrocele s/p surgical correction  -resolved  discharged from urology    No complaints at this time     CAD s/p coronary stent in   -well controlled, MI was 20 years ago, denies any current chest pain  -continue ASA, statin, beta blocker; stopped ACE-i due to cough     Dyslipidemia  -Lipid panels on 10/24  triglycerides  of 185  Continue atorvastatin 40 daily and vascepa   - Advised healthy diet and lifestyle   - will start patient on fenofibrate 54 daily   - will get lipid panel in 3-4 months    Diverticulosis - no complaints at thsi time  -last episode of diverticulitis was in 2016  -encouraged high fiber diet, and weight loss  -last colonoscopy was     L eye cataract s/p cataract surgery  following up with ophthalmology [facility].  follows ophthalmology clinic      Right shoulder pain   Concern for rotator cuff shoulder   Pain and tenderness to raise arm above 90 degree angle and on external rotation  Empty can test positive on Right shoulder   Will get an xray of shoulder to r/o fractures and if that's normal patient needs MRI of Right shoulder       Health Maintenance  Pneumononia UTD  Shingles UPTD   Flu shot 23,   DM foot exam utd   DM eye exam :uptd   -Lung Ca. Screenin pack year but quit more than 20 years ago ; will defer at this time   -Colon Ca. Screening: Colonscopy due   -AAA:  - neg    Health Maintenance   Topic Date Due    Colorectal Cancer Screening  2025    Hemoglobin A1c  2025    Foot Exam  2025    Eye Exam  2025    High Dose Statin  2025    Lipid Panel  10/29/2025    TETANUS VACCINE  2028    Hepatitis C Screening  Completed    Shingles Vaccine  Completed    Abdominal Aortic Aneurysm Screening  Completed                  Labs at next  visit  Xray of right shoulder ordered  F/u in 3 months     Daphney Marino DO   [facility]

## 2024-11-07 RX ORDER — FLUTICASONE PROPIONATE 50 MCG
SPRAY, SUSPENSION (ML) NASAL
Qty: 9.9 ML | Refills: 0 | Status: SHIPPED | OUTPATIENT
Start: 2024-11-07

## 2024-11-12 ENCOUNTER — TELEPHONE (OUTPATIENT)
Dept: INTERNAL MEDICINE | Facility: CLINIC | Age: 69
End: 2024-11-12
Payer: COMMERCIAL

## 2024-11-12 NOTE — TELEPHONE ENCOUNTER
Spoke with patient and informed of xray showing arthritis and to take tylenol for pain per Dr Marino, patient verbalized understanding and pleased.

## 2024-11-12 NOTE — TELEPHONE ENCOUNTER
----- Message from Daphney Marino sent at 11/12/2024  8:56 AM CST -----  Please let pt know that xray of the shoulder showed arthritis. Patient can take tylenol for it. Cautious about taking ibuprofen.       Dr Marino

## 2024-12-09 RX ORDER — ICOSAPENT ETHYL 1 G/1
1 CAPSULE ORAL DAILY
Qty: 90 CAPSULE | Refills: 0 | Status: SHIPPED | OUTPATIENT
Start: 2024-12-09 | End: 2025-03-09

## 2024-12-09 RX ORDER — ICOSAPENT ETHYL 0.5 G/1
CAPSULE ORAL DAILY
Status: CANCELLED | OUTPATIENT
Start: 2024-12-09

## 2025-01-03 DIAGNOSIS — E11.9 TYPE 2 DIABETES MELLITUS WITHOUT COMPLICATION, WITHOUT LONG-TERM CURRENT USE OF INSULIN: ICD-10-CM

## 2025-01-06 RX ORDER — SITAGLIPTIN 100 MG/1
100 TABLET, FILM COATED ORAL DAILY
Qty: 90 TABLET | Refills: 3 | Status: SHIPPED | OUTPATIENT
Start: 2025-01-06

## 2025-01-06 RX ORDER — METFORMIN HYDROCHLORIDE 1000 MG/1
1000 TABLET ORAL 2 TIMES DAILY
Qty: 180 TABLET | Refills: 3 | Status: SHIPPED | OUTPATIENT
Start: 2025-01-06

## 2025-01-07 DIAGNOSIS — E78.5 HYPERLIPIDEMIA, UNSPECIFIED HYPERLIPIDEMIA TYPE: ICD-10-CM

## 2025-01-07 DIAGNOSIS — E11.9 TYPE 2 DIABETES MELLITUS WITHOUT COMPLICATION, WITHOUT LONG-TERM CURRENT USE OF INSULIN: ICD-10-CM

## 2025-01-08 RX ORDER — ATORVASTATIN CALCIUM 40 MG/1
40 TABLET, FILM COATED ORAL DAILY
Qty: 90 TABLET | Refills: 3 | Status: SHIPPED | OUTPATIENT
Start: 2025-01-08

## 2025-01-08 RX ORDER — GLIPIZIDE 10 MG/1
10 TABLET ORAL 2 TIMES DAILY
Qty: 720 TABLET | Refills: 0 | Status: SHIPPED | OUTPATIENT
Start: 2025-01-08 | End: 2026-01-08

## 2025-01-08 RX ORDER — METOPROLOL TARTRATE 50 MG/1
50 TABLET ORAL 2 TIMES DAILY
Qty: 180 TABLET | Refills: 3 | Status: SHIPPED | OUTPATIENT
Start: 2025-01-08

## 2025-02-03 RX ORDER — FENOFIBRATE 54 MG/1
54 TABLET ORAL DAILY
Qty: 90 TABLET | Refills: 0 | Status: SHIPPED | OUTPATIENT
Start: 2025-02-03 | End: 2025-05-04

## 2025-03-10 RX ORDER — ICOSAPENT ETHYL 1 G/1
1 CAPSULE ORAL DAILY
Qty: 90 CAPSULE | Refills: 0 | Status: SHIPPED | OUTPATIENT
Start: 2025-03-10 | End: 2025-06-08

## 2025-03-12 DIAGNOSIS — I10 HYPERTENSION, UNSPECIFIED TYPE: ICD-10-CM

## 2025-03-12 RX ORDER — LOSARTAN POTASSIUM 25 MG/1
25 TABLET ORAL 2 TIMES DAILY
Qty: 180 TABLET | Refills: 3 | Status: SHIPPED | OUTPATIENT
Start: 2025-03-12

## 2025-03-18 ENCOUNTER — LAB VISIT (OUTPATIENT)
Dept: LAB | Facility: HOSPITAL | Age: 70
End: 2025-03-18
Attending: STUDENT IN AN ORGANIZED HEALTH CARE EDUCATION/TRAINING PROGRAM
Payer: COMMERCIAL

## 2025-03-18 DIAGNOSIS — M25.511 ACUTE PAIN OF RIGHT SHOULDER: ICD-10-CM

## 2025-03-18 DIAGNOSIS — E11.9 TYPE 2 DIABETES MELLITUS WITHOUT COMPLICATION, WITHOUT LONG-TERM CURRENT USE OF INSULIN: ICD-10-CM

## 2025-03-18 DIAGNOSIS — E78.5 HYPERLIPIDEMIA, UNSPECIFIED HYPERLIPIDEMIA TYPE: ICD-10-CM

## 2025-03-18 LAB
ALBUMIN SERPL-MCNC: 3.6 G/DL (ref 3.4–4.8)
ALBUMIN/GLOB SERPL: 1.1 RATIO (ref 1.1–2)
ALP SERPL-CCNC: 89 UNIT/L (ref 40–150)
ALT SERPL-CCNC: 15 UNIT/L (ref 0–55)
ANION GAP SERPL CALC-SCNC: 4 MEQ/L
AST SERPL-CCNC: 16 UNIT/L (ref 5–34)
BILIRUB SERPL-MCNC: 0.3 MG/DL
BUN SERPL-MCNC: 15.8 MG/DL (ref 8.4–25.7)
CALCIUM SERPL-MCNC: 8.8 MG/DL (ref 8.8–10)
CHLORIDE SERPL-SCNC: 111 MMOL/L (ref 98–107)
CO2 SERPL-SCNC: 23 MMOL/L (ref 23–31)
CREAT SERPL-MCNC: 1.09 MG/DL (ref 0.72–1.25)
CREAT/UREA NIT SERPL: 14
EST. AVERAGE GLUCOSE BLD GHB EST-MCNC: 154.2 MG/DL
GFR SERPLBLD CREATININE-BSD FMLA CKD-EPI: >60 ML/MIN/1.73/M2
GLOBULIN SER-MCNC: 3.2 GM/DL (ref 2.4–3.5)
GLUCOSE SERPL-MCNC: 127 MG/DL (ref 82–115)
HBA1C MFR BLD: 7 %
POTASSIUM SERPL-SCNC: 4.6 MMOL/L (ref 3.5–5.1)
PROT SERPL-MCNC: 6.8 GM/DL (ref 5.8–7.6)
SODIUM SERPL-SCNC: 138 MMOL/L (ref 136–145)
TRIGL SERPL-MCNC: 100 MG/DL (ref 34–140)

## 2025-03-18 PROCEDURE — 83036 HEMOGLOBIN GLYCOSYLATED A1C: CPT

## 2025-03-18 PROCEDURE — 80053 COMPREHEN METABOLIC PANEL: CPT

## 2025-03-18 PROCEDURE — 36415 COLL VENOUS BLD VENIPUNCTURE: CPT

## 2025-03-18 PROCEDURE — 84478 ASSAY OF TRIGLYCERIDES: CPT

## 2025-03-26 ENCOUNTER — OFFICE VISIT (OUTPATIENT)
Dept: INTERNAL MEDICINE | Facility: CLINIC | Age: 70
End: 2025-03-26
Payer: COMMERCIAL

## 2025-03-26 VITALS
HEART RATE: 63 BPM | BODY MASS INDEX: 31.43 KG/M2 | RESPIRATION RATE: 18 BRPM | TEMPERATURE: 98 F | SYSTOLIC BLOOD PRESSURE: 132 MMHG | DIASTOLIC BLOOD PRESSURE: 75 MMHG | HEIGHT: 68 IN | OXYGEN SATURATION: 98 % | WEIGHT: 207.38 LBS

## 2025-03-26 DIAGNOSIS — Z12.11 COLON CANCER SCREENING: Primary | ICD-10-CM

## 2025-03-26 PROCEDURE — 99215 OFFICE O/P EST HI 40 MIN: CPT | Mod: PBBFAC | Performed by: STUDENT IN AN ORGANIZED HEALTH CARE EDUCATION/TRAINING PROGRAM

## 2025-03-26 NOTE — PROGRESS NOTES
"    [facility]    Subjective:      66 year old  male Diabetes, HTN, CAD S/P stent 20 years ago , HLD.  Denies any chest pain, shortness of breath, fever, chills, nausea, vomiting, abdominal pain  or any weakness at this time. Refilled meds at this time          11/4/24: Complaints of right shoulder pain worse with external rotation and abduction, pain while raising his right arm above 90 degrees. No swelling, skin changes noted on right shoulder joint. Denies any weakness, numbness or tingling in extremities on the right arm. Patient stated that he had similar complaints before years ago and patient went to urgent care and had gotten cortisone injection for bursitis. Denies any trauma, or injury to right arm.     3/26/25: shoulder pain is resolved. A1c and triglycerides improved after he stopped eating crabs. Feeling better. Stopped taking fenofibrate after 2 days since he was having side effects of cramping everywhere in his body.     Review of Systems:  10 point ROS negative except for HPI    Objective:   Vital Signs:  Vitals:    03/26/25 0743   BP: 132/75   BP Location: Left arm   Patient Position: Sitting   Pulse: 63   Resp: 18   Temp: 97.5 °F (36.4 °C)   TempSrc: Oral   SpO2: 98%   Weight: 94.1 kg (207 lb 6.4 oz)   Height: 5' 8" (1.727 m)             Body mass index is 31.54 kg/m².     General: Alert and oriented, No acute distress.  Respiratory: Lungs are clear to auscultation, Respirations are non-labored, Breath sounds  are equal, Symmetrical chest wall expansion, No chest wall tenderness.  Cardiovascular: Normal rate, Regular rhythm, No murmur, No gallop, Good pulses equal in  all extremities, Normal peripheral perfusion, No edema.  Musculoskeletal:Normal range of motion, Normal strength, No tenderness, No swelling,  No deformity, Normal gait.  Integumentary: Warm, Pink, Intact, Moist, No pallor, No rash.  Cognition and Speech: Oriented, Speech clear and coherent, " Return here if you have worsening swelling of the face, trouble walking, other concerns   "Functional cognition intact.  Neuro exam : empty can test positive on Right shoulder, Tenderness on right shoulder while raising his arm nichelle than 90 degrees, sensation and motor exam intact otherwise.         Laboratory:  Lab Results   Component Value Date    WBC 7.32 04/15/2024    HGB 13.9 (L) 04/15/2024    HCT 43.5 04/15/2024     04/15/2024    MCV 88.2 04/15/2024    RDW 15.0 04/15/2024    Lab Results   Component Value Date     03/18/2025    K 4.6 03/18/2025     (H) 03/18/2025    CO2 23 03/18/2025    BUN 15.8 03/18/2025    CREATININE 1.09 03/18/2025    CALCIUM 8.8 03/18/2025      Lab Results   Component Value Date    HGBA1C 7.0 03/18/2025    .2 03/18/2025    CREATININE 1.09 03/18/2025    CREATRANDUR 356.9 (H) 04/15/2024    PROTEINURINE 18.7 04/15/2024    No results found for: "TSH", "KCQLEX8XCLY", "E4WFZFE", "K9ZQIFI", "THYROIDAB"             Current Medications:  Current Outpatient Medications   Medication Instructions    aspirin (ECOTRIN) 81 mg, Oral, Daily    atorvastatin (LIPITOR) 40 mg, Oral, Daily    fenofibrate (TRICOR) 54 mg, Oral, Daily    fluticasone propionate (FLONASE) 50 mcg/actuation nasal spray See Instructions, USE 1 SPRAY(S) IN EACH NOSTRIL TWICE DAILY FOR 30 DAYS, # 16 gm, 0 Refill(s), Pharmacy: [phone], 174, cm, Height/Length Dosing, 09/13/21 10:53:00 CDT, 94.3, kg, Weight Dosing, 09/13/21 10:53:00 CDT    glipiZIDE (GLUCOTROL) 10 mg, Oral, 2 times daily    icosapent ethyL (VASCEPA) 1,000 mg, Oral, Daily    JANUVIA 100 mg, Oral, Daily    losartan (COZAAR) 25 mg, Oral, 2 times daily    metFORMIN (GLUCOPHAGE) 1,000 mg, Oral, 2 times daily    metoprolol tartrate (LOPRESSOR) 50 mg, Oral, 2 times daily        Assessment and Plan:      Left heel pain  Likely Plantar fascitis -resolved  -Left heel pain  - Xray of the left foot neg    Diabetes mellitus type II - Controlled  -POC 6.8 >7.7>7.3>7.8>7.0  -continue Januvia (sitagliptin) 100mg PO daily  -continue metformin " 1000mg BID and glipizide 10mg BID   - pt stated he has been not eating correctly and not following diabetic diet; Will   Advised BS log   - Recheck in 3 months  - DM eye exam uptd  Follows with optho outpt  -Foot exam uptd   - POC A1c at next visit     Hypertension  -normotensive 132/75  -continue metoprolol tartrate 50mg BID & losartan to 25mg BID (convenient dosing as  metoprolol already given BID)    L Hydrocele s/p surgical correction  -resolved  discharged from urology    No complaints at this time     CAD s/p coronary stent in   -well controlled, MI was 20 years ago, denies any current chest pain  -continue ASA, statin, beta blocker; stopped ACE-i due to cough     Dyslipidemia  -Lipid panels on 10/24  triglycerides  of 185 to 100 3/26/25  Continue atorvastatin 40 daily  Insurance does not approve/ pay for his Vascepa   - Advised healthy diet and lifestyle   - pt stopped  fenofibrate 54 daily due to side effects  -     Diverticulosis - no complaints at si time  -last episode of diverticulitis was in 2016  -encouraged high fiber diet, and weight loss  -last colonoscopy was     L eye cataract s/p cataract surgery  following up with ophthalmology [facility] clinic.  follows ophthalmology clinic      Right shoulder pain - resolved   Concern for rotator cuff shoulder   Pain and tenderness to raise arm above 90 degree angle and on external rotation   xray of shoulder shows degenerative changes   Pain improved with tylenol       Health Maintenance  Pneumononia UTD  Shingles UPTD   Flu shot 23,   DM foot exam utd   DM eye exam :uptd   -Lung Ca. Screenin pack year but quit more than 20 years ago ; will defer at this time   -Colon Ca. Screening: Colonscopy due   -AAA:  - neg    Health Maintenance   Topic Date Due    RSV Vaccine (Age 60+ and Pregnant patients) (1 - Risk 60-74 years 1-dose series) Never done    Diabetes Urine Screening  2022    COVID-19 Vaccine (3 -  2024-25 season) 09/01/2024    Colorectal Cancer Screening  04/14/2025    Foot Exam  07/17/2025    Diabetic Eye Exam  07/22/2025    Hemoglobin A1c  09/18/2025    Lipid Panel  10/29/2025    High Dose Statin  01/08/2026    TETANUS VACCINE  06/14/2028    Hepatitis C Screening  Completed    Shingles Vaccine  Completed    Influenza Vaccine  Completed    Pneumococcal Vaccines (Age 50+)  Completed    Abdominal Aortic Aneurysm Screening  Completed          F/u in 3 months   Need A1c at next visit     Daphney Marino DO   [facility] Internal Medicine Resident Clinic

## 2025-06-04 RX ORDER — ICOSAPENT ETHYL 1 G/1
1 CAPSULE ORAL DAILY
Qty: 90 CAPSULE | Refills: 0 | Status: SHIPPED | OUTPATIENT
Start: 2025-06-04 | End: 2025-09-02

## 2025-07-21 ENCOUNTER — OFFICE VISIT (OUTPATIENT)
Dept: INTERNAL MEDICINE | Facility: CLINIC | Age: 70
End: 2025-07-21
Payer: COMMERCIAL

## 2025-07-21 VITALS
DIASTOLIC BLOOD PRESSURE: 72 MMHG | WEIGHT: 204.63 LBS | BODY MASS INDEX: 31.01 KG/M2 | RESPIRATION RATE: 16 BRPM | TEMPERATURE: 98 F | HEIGHT: 68 IN | HEART RATE: 62 BPM | OXYGEN SATURATION: 98 % | SYSTOLIC BLOOD PRESSURE: 128 MMHG

## 2025-07-21 DIAGNOSIS — E78.5 HYPERLIPIDEMIA, UNSPECIFIED HYPERLIPIDEMIA TYPE: ICD-10-CM

## 2025-07-21 DIAGNOSIS — I10 HYPERTENSION, UNSPECIFIED TYPE: ICD-10-CM

## 2025-07-21 DIAGNOSIS — I25.10 CORONARY ARTERY DISEASE, UNSPECIFIED VESSEL OR LESION TYPE, UNSPECIFIED WHETHER ANGINA PRESENT, UNSPECIFIED WHETHER NATIVE OR TRANSPLANTED HEART: ICD-10-CM

## 2025-07-21 DIAGNOSIS — E11.9 TYPE 2 DIABETES MELLITUS WITHOUT COMPLICATION, WITHOUT LONG-TERM CURRENT USE OF INSULIN: Primary | ICD-10-CM

## 2025-07-21 LAB — HBA1C MFR BLD: 7.4 %

## 2025-07-21 PROCEDURE — 83036 HEMOGLOBIN GLYCOSYLATED A1C: CPT | Mod: PBBFAC

## 2025-07-21 PROCEDURE — 99213 OFFICE O/P EST LOW 20 MIN: CPT | Mod: PBBFAC

## 2025-07-21 RX ORDER — METOPROLOL TARTRATE 50 MG/1
50 TABLET ORAL 2 TIMES DAILY
Qty: 180 TABLET | Refills: 3 | Status: SHIPPED | OUTPATIENT
Start: 2025-07-21

## 2025-07-21 RX ORDER — SITAGLIPTIN 100 MG/1
100 TABLET, FILM COATED ORAL DAILY
Qty: 90 TABLET | Refills: 3 | Status: SHIPPED | OUTPATIENT
Start: 2025-07-21

## 2025-07-21 RX ORDER — ASPIRIN 81 MG/1
81 TABLET ORAL DAILY
Qty: 90 TABLET | Refills: 3 | Status: SHIPPED | OUTPATIENT
Start: 2025-07-21

## 2025-07-21 RX ORDER — GLIPIZIDE 10 MG/1
10 TABLET ORAL 2 TIMES DAILY
Qty: 180 TABLET | Refills: 3 | Status: SHIPPED | OUTPATIENT
Start: 2025-07-21 | End: 2026-07-21

## 2025-07-21 RX ORDER — METFORMIN HYDROCHLORIDE 1000 MG/1
1000 TABLET ORAL 2 TIMES DAILY
Qty: 180 TABLET | Refills: 3 | Status: SHIPPED | OUTPATIENT
Start: 2025-07-21

## 2025-07-21 RX ORDER — LOSARTAN POTASSIUM 25 MG/1
25 TABLET ORAL 2 TIMES DAILY
Qty: 180 TABLET | Refills: 3 | Status: SHIPPED | OUTPATIENT
Start: 2025-07-21

## 2025-07-21 RX ORDER — ATORVASTATIN CALCIUM 40 MG/1
40 TABLET, FILM COATED ORAL DAILY
Qty: 90 TABLET | Refills: 3 | Status: SHIPPED | OUTPATIENT
Start: 2025-07-21

## 2025-07-21 NOTE — PROGRESS NOTES
Attending physician addendum-  Patient discussed in clinic with the resident.   Care provided is appropriate.   Patient seen face to face in clinic. We discussed diabetes control.

## 2025-07-21 NOTE — PROGRESS NOTES
[facility] Internal Medicine  CLINIC NOTE    Patient's Name: Nash Marley  : 1955  MRN: [id number]  Date: 2025     Chief Complaint  Chief Complaint   Patient presents with    Follow-up    Medication Refill       SUBJECTIVE   HPI:  Nash Marley is a 70 y.o. male with a PMHx of HTN, CAD s/p stent x2 (), HLD, and T2DM who presents to [facility] for routine follow-up.    Patient was last seen in clinic on 3/26/2025 with Dr. Marino. Last HPI:  shoulder pain is resolved. A1c and triglycerides improved after he stopped eating crabs. Feeling better. Stopped taking fenofibrate after 2 days since he was having side effects of cramping everywhere in his body.    Interval History:  Today, patient states that his plantar fasciitis has been acting up in the past several months. He states that he previously had old shoes for about 3-4 years but has recently switched to new shoes, which has improved the pain. He reports that he previously used massage to help with the pain but his massager broke. He states that it's worse with standing/walking. He also reports that the pain is responsive to Tylenol. Otherwise, he denies any other complaints at this time.    He is also requesting a pre-operative examination prior to his colonoscopy scheduled on 2026 given history of CAD with MI. He denies any chest pain, shortness of breath, palpitations, and orthopnea at this time. Will perform full assessment at next clinic visit.    Review of Systems   Constitutional:  Negative for chills and fever.   Respiratory:  Negative for shortness of breath.    Cardiovascular:  Negative for chest pain, palpitations, orthopnea and leg swelling.   Gastrointestinal:  Negative for abdominal pain, constipation, diarrhea, nausea and vomiting.   Musculoskeletal:         Bilateral heel pain     Current Medications:  Current Outpatient Medications   Medication Instructions    aspirin  (ECOTRIN) 81 mg, Oral, Daily    atorvastatin (LIPITOR) 40 mg, Oral, Daily    fluticasone propionate (FLONASE) 50 mcg/actuation nasal spray See Instructions, USE 1 SPRAY(S) IN EACH NOSTRIL TWICE DAILY FOR 30 DAYS, # 16 gm, 0 Refill(s), Pharmacy: [facility], 174, cm, Height/Length Dosing, 09/13/21 10:53:00 CDT, 94.3, kg, Weight Dosing, 09/13/21 10:53:00 CDT    glipiZIDE (GLUCOTROL) 10 mg, Oral, 2 times daily    JANUVIA 100 mg, Oral, Daily    losartan (COZAAR) 25 mg, Oral, 2 times daily    metFORMIN (GLUCOPHAGE) 1,000 mg, Oral, 2 times daily    metoprolol tartrate (LOPRESSOR) 50 mg, Oral, 2 times daily       OBJECTIVE     Vitals:    07/21/25 0803   BP: 128/72   Pulse: 62   Resp: 16   Temp: 97.7 °F (36.5 °C)     Body mass index is 31.11 kg/m².  Physical Exam  Vitals reviewed.   Constitutional:       General: He is not in acute distress.     Appearance: Normal appearance. He is normal weight. He is not ill-appearing or toxic-appearing.   HENT:      Head: Normocephalic and atraumatic.      Right Ear: External ear normal.      Left Ear: External ear normal.      Nose: Nose normal.   Eyes:      General: No scleral icterus.     Conjunctiva/sclera: Conjunctivae normal.   Cardiovascular:      Rate and Rhythm: Normal rate and regular rhythm.      Pulses: Normal pulses.      Heart sounds: Normal heart sounds.   Pulmonary:      Effort: Pulmonary effort is normal. No respiratory distress.      Breath sounds: Normal breath sounds. No stridor. No wheezing, rhonchi or rales.   Abdominal:      General: Abdomen is flat. There is no distension.      Palpations: Abdomen is soft. There is no mass.      Tenderness: There is no abdominal tenderness. There is no guarding or rebound.   Musculoskeletal:      Right lower leg: No edema.      Left lower leg: No edema.      Comments: TTP of the bilateral plantar surface near heels   Skin:     General: Skin is warm and dry.      Capillary Refill: Capillary refill takes less than 2  "seconds.   Neurological:      General: No focal deficit present.      Mental Status: He is alert and oriented to person, place, and time.   Psychiatric:         Mood and Affect: Mood normal.         Behavior: Behavior normal.     Protective Sensation (w/ 10 gram monofilament):  Right: Intact  Left: Intact    Visual Inspection:  Normal -  Bilateral, Nails Intact - without Evidence of Foot Deformity- Bilateral, Callus -  Bilateral, and Dry Skin -  Bilateral    Pedal Pulses:   Right: Present  Left: Present    Labs:  CBC:  Lab Results   Component Value Date    WBC 7.32 04/15/2024    HGB 13.9 (L) 04/15/2024    HCT 43.5 04/15/2024     04/15/2024    MCV 88.2 04/15/2024    RDW 15.0 04/15/2024     BMP/CMP:  Lab Results   Component Value Date     03/18/2025    K 4.6 03/18/2025     (H) 03/18/2025    CO2 23 03/18/2025    BUN 15.8 03/18/2025    CREATININE 1.09 03/18/2025    EGFRNORACEVR >60 03/18/2025     RFTs/LFTs:  Lab Results   Component Value Date    CALCIUM 8.8 03/18/2025    ALBUMIN 3.6 03/18/2025    AST 16 03/18/2025    ALT 15 03/18/2025    ALKPHOS 89 03/18/2025    BILITOT 0.3 03/18/2025    BILIDIR 0.2 09/03/2021    IBILI 0.20 09/03/2021     Lipid Panel:  Lab Results   Component Value Date    CHOL 104 10/29/2024    CHOL 121 11/29/2023    HDL 29 (L) 10/29/2024    HDL 32 (L) 11/29/2023    LDL 38.00 (L) 10/29/2024    LDL 54.00 11/29/2023    TRIG 100 03/18/2025    TRIG 185 (H) 10/29/2024     Diabetes Panel:  Lab Results   Component Value Date    SILWMRS1W 7.4 07/21/2025    BPCXHBV2V 7.4 04/22/2024    HGBA1C 7.0 03/18/2025    HGBA1C 7.8 (H) 10/29/2024    LABMICR 24.9 09/03/2021    CREATRANDUR 356.9 (H) 04/15/2024    CREATRANDUR 374.3 (H) 08/08/2023    MICALBCREAT 16.6 09/03/2021     Thyroid Panel:  No results found for: "TSH", "FREET4", "U7DNVSR", "D7PKKEH", "THYROIDAB"  Anemia Panel:  No results found for: "IRON", "TIBC", "FERRITIN", "HDVBYFQX60", "FOLATE"    Interval Imaging  X-ray Shoulder 2 or More Views " Right  Narrative: EXAMINATION:  XR SHOULDER COMPLETE 2 OR MORE VIEWS RIGHT    CLINICAL HISTORY:  Pain in right shoulder    COMPARISON:  None.    FINDINGS:  No acute displaced fractures or dislocations.    There is some narrowing of the acromioclavicular joint with narrowing of the inferior margin of the glenohumeral joint articular spaces are otherwise preserved with smooth articular surfaces    No blastic or lytic lesions.    Soft tissues within normal limits.  Impression: Degenerative changes.    Electronically signed by: Shant Carrillo  Date:    11/04/2024  Time:    12:54       ASSESSMENT/PLAN   Bilateral plantar fascitis  Patient reports flare-up of known history of plantar fascitis bilaterally today. Patient recently changed shoes with improvement of pain. Responsive to Tylenol.  Prior left XR (9/2021) negative.  Counseled patient on massage therapy using tennis ball.  If pain refractory, can refer to Podiatry for local steroid injection.    Hypertension  Current medications: losartan 25 mg BID and Lopressor 50 mg BID. (Losartan taken as BID due to convenience of dosing with metoprolol).  Ambulatory BP today 128/72. Controlled.  Medications refilled.    Type 2 diabetes mellitus  Current medications: metformin 1000 mg BID, Januvia 100 mg, and glipizide 10 mg BID.  A1c today 7.4%  Urine microalbumin/creatinine ratio ordered.  Foot exam performed today.  Last eye exam on 9/2024. Patient currently follows with [facility] with next appointment on 9/2025. Will defer eye screenings to them.  Consider adding SGLT2i at next clinic visit if still uncontrolled.  Ordered repeat A1c to be done at next clinic visit.  Medications refilled.    CAD s/p stent x2 (2003)  Hyperlipidemia  Patient previously had MI about 20 years ago in which he underwent PCI with stenting.  Current medication: aspirin 81 mg, atorvastatin 40 mg, and metoprolol tartrate 50 mg BID daily. Previously on fenofibrate but discontinued due to  cramping. Patient also previously on Vascepa but not covered.  Last lipid panel with LDL 38 on 10/2024. Triglyceride 100 on 3/18/2025.  Patient previously on ACEi but stopped due to cough.  Ordered repeat lipid panel before next clinic visit.  Medications refilled.    Left hydrocele s/p hydrocelectomy (9/2018)  Prior history of hydrocele with surgical correction in 9/2018 with [facility]. No longer follows with Urology.  No complaints at this time.      Sigmoid diverticulosis  History of diverticulitis (3/2016)  No complaints at this time.  Encouraged high fiber diet and weight loss  Last colonoscopy was 2015. Next scheduled is 1/2026.     Left eye cataract s/p cataract surgery  No complaints at this time.  Follows with [facility].     Right shoulder pain (resolved)  Concern for rotator cuff shoulder due to pain and tenderness to raise arm above 90 degree angle and on external rotation  Xray of right shoulder shows degenerative changes   Pain improved with tylenol.    Health Maintenance  Immunization History   Administered Date(s) Administered    COVID-19, MRNA, LN-S, PF (Pfizer) (Purple Cap) 07/19/2021, 08/09/2021    Influenza 10/22/2014, 10/28/2015, 10/03/2017    Influenza - Quadrivalent 12/15/2016, 10/01/2020    Influenza - Quadrivalent - High Dose - PF (65 years and older) 11/20/2021    Influenza - Quadrivalent - PF (6-35 months) 10/03/2017, 10/29/2018, 12/26/2019    Influenza - Quadrivalent - PF *Preferred* (6 months and older) 12/15/2016, 12/26/2019    Influenza - Trivalent - Fluad - Adjuvanted - PF (65 years and older 10/11/2022, 12/05/2023, 11/04/2024    Influenza - Trivalent - Fluarix, Flulaval, Fluzone, Afluria - PF 10/22/2014, 10/28/2015    Influenza - Trivalent - Fluzone High Dose - PF (65 years and older) 11/20/2021    Pneumococcal 10/28/2015    Pneumococcal Conjugate - 13 Valent 10/28/2015    Pneumococcal Polysaccharide - 23 Valent 05/06/2021    Tdap 06/14/2018    Zoster Recombinant  10/29/2018, 05/17/2019     Health Maintenance Due   Topic Date Due    RSV Vaccine (Age 60+ and Pregnant patients) (1 - Risk 60-74 years 1-dose series) Never done    Diabetes Urine Screening  09/03/2022    COVID-19 Vaccine (3 - 2024-25 season) 09/01/2024    Colorectal Cancer Screening  04/14/2025    Diabetic Eye Exam  07/22/2025     Cancer Screening:  Colonoscopy:     Scheduled on 1/2026.  Lung Cancer:     Address at next clinic visit.  Other screening:  Abdo aortic aneurysm:   Completed on 12/2021. Negative.  Diabetes (A1c):    A1c today 7.4%.  Diabetic Eye Screening:  Follows with [facility].  Diabetic Foot Exam:   Performed today.  Diabetic Urine Screening:  Ordered today.  Hepatitis C:     Completed on 4/2024  HIV:     Not indicated due to age.  Lipid Screening:   Last performed on 3/2025. Within goal.      Items to follow up at next clinic appointment:  - Preop exam for colonoscopy at next clinic visit.  - Labs before next clinic visit.    Internal Medicine Appointments:  - Follow up at [facility] in 3 months.    Other Appointments:    Future Appointments   Date Time Provider Department Center   9/4/2025  8:40 AM [facility] [place]    11/12/2025  7:50 AM Milton Ayers MD [facility] [place] [facility]          Case was discussed with Dr. Mcpherson. Please appreciate attending's attestation to follow.    Milton Ayers MD  PGY-II Resident  [facility]  07/21/2025

## 2025-08-18 ENCOUNTER — TELEPHONE (OUTPATIENT)
Dept: INTERNAL MEDICINE | Facility: CLINIC | Age: 70
End: 2025-08-18
Payer: COMMERCIAL

## 2025-08-18 DIAGNOSIS — E78.2 MIXED HYPERLIPIDEMIA: Primary | ICD-10-CM

## 2025-08-18 RX ORDER — ICOSAPENT ETHYL 1 G/1
1 CAPSULE ORAL DAILY
Qty: 90 CAPSULE | Refills: 1 | Status: SHIPPED | OUTPATIENT
Start: 2025-08-18

## (undated) DEVICE — KIT SURGICAL TURNOVER

## (undated) DEVICE — GLOVE SIGNATURE MICRO LTX 8

## (undated) DEVICE — GLOVE SENSICARE PI GRN 6.5

## (undated) DEVICE — CANNULA OPTH 27G .41X22MM

## (undated) DEVICE — SYR 3CC LUER LOC

## (undated) DEVICE — DRESSING TRANS 2X2 TEGADERM

## (undated) DEVICE — GLOVE SIGNATURE MICRO LTX 7

## (undated) DEVICE — NDL FLTR 5MCRN BLNT TIP 18GX1

## (undated) DEVICE — PROTECTOR ONE-STEP ARM REG

## (undated) DEVICE — CONTAINER SPECIMEN OR STER 4OZ

## (undated) DEVICE — UHC CATARACT KIT

## (undated) DEVICE — PACK CATARACT BAUSCH AND LOMB

## (undated) DEVICE — NDL MAGELLAN SAFETY 18G 1.5IN

## (undated) DEVICE — TAPE CURAD PAPER ADH 1INX10YD